# Patient Record
Sex: FEMALE | Race: WHITE | NOT HISPANIC OR LATINO | Employment: FULL TIME | URBAN - METROPOLITAN AREA
[De-identification: names, ages, dates, MRNs, and addresses within clinical notes are randomized per-mention and may not be internally consistent; named-entity substitution may affect disease eponyms.]

---

## 2019-01-02 ENCOUNTER — APPOINTMENT (OUTPATIENT)
Dept: RADIOLOGY | Facility: CLINIC | Age: 64
End: 2019-01-02
Payer: COMMERCIAL

## 2019-01-02 ENCOUNTER — OFFICE VISIT (OUTPATIENT)
Dept: URGENT CARE | Facility: CLINIC | Age: 64
End: 2019-01-02
Payer: COMMERCIAL

## 2019-01-02 VITALS
OXYGEN SATURATION: 97 % | SYSTOLIC BLOOD PRESSURE: 143 MMHG | HEIGHT: 63 IN | TEMPERATURE: 97 F | BODY MASS INDEX: 37.21 KG/M2 | RESPIRATION RATE: 20 BRPM | WEIGHT: 210 LBS | DIASTOLIC BLOOD PRESSURE: 89 MMHG | HEART RATE: 104 BPM

## 2019-01-02 DIAGNOSIS — R05.9 COUGH: ICD-10-CM

## 2019-01-02 DIAGNOSIS — J18.9 PNEUMONIA OF RIGHT LOWER LOBE DUE TO INFECTIOUS ORGANISM: ICD-10-CM

## 2019-01-02 DIAGNOSIS — R06.00 DYSPNEA ON EXERTION: ICD-10-CM

## 2019-01-02 DIAGNOSIS — R05.9 COUGH: Primary | ICD-10-CM

## 2019-01-02 PROCEDURE — 99203 OFFICE O/P NEW LOW 30 MIN: CPT | Performed by: EMERGENCY MEDICINE

## 2019-01-02 PROCEDURE — 71046 X-RAY EXAM CHEST 2 VIEWS: CPT

## 2019-01-02 PROCEDURE — 93005 ELECTROCARDIOGRAM TRACING: CPT

## 2019-01-02 RX ORDER — ESCITALOPRAM OXALATE 10 MG/1
5 TABLET ORAL DAILY
COMMUNITY

## 2019-01-02 RX ORDER — DULOXETIN HYDROCHLORIDE 60 MG/1
20 CAPSULE, DELAYED RELEASE ORAL DAILY
COMMUNITY

## 2019-01-02 RX ORDER — AMITRIPTYLINE HYDROCHLORIDE 25 MG/1
25 TABLET, FILM COATED ORAL
COMMUNITY

## 2019-01-02 RX ORDER — ALBUTEROL SULFATE 90 UG/1
2 AEROSOL, METERED RESPIRATORY (INHALATION) EVERY 6 HOURS PRN
Qty: 8.5 G | Refills: 0 | Status: SHIPPED | OUTPATIENT
Start: 2019-01-02

## 2019-01-02 RX ORDER — FENOFIBRATE 160 MG/1
160 TABLET ORAL DAILY
COMMUNITY

## 2019-01-02 RX ORDER — BENZONATATE 200 MG/1
200 CAPSULE ORAL 3 TIMES DAILY PRN
Qty: 12 CAPSULE | Refills: 0 | Status: SHIPPED | OUTPATIENT
Start: 2019-01-02

## 2019-01-02 RX ORDER — LEVOFLOXACIN 750 MG/1
750 TABLET ORAL EVERY 24 HOURS
Qty: 7 TABLET | Refills: 0 | Status: SHIPPED | OUTPATIENT
Start: 2019-01-02 | End: 2019-01-09

## 2019-01-02 RX ORDER — LOSARTAN POTASSIUM 100 MG/1
100 TABLET ORAL DAILY
COMMUNITY

## 2019-01-02 NOTE — PATIENT INSTRUCTIONS
Bacterial Pneumonia   WHAT YOU NEED TO KNOW:   Bacterial pneumonia is a lung infection caused by bacteria  It makes your lungs inflamed, which means they cannot work well  Bacterial pneumonia germs are easily spread when an infected person coughs, sneezes, or has close contact with others  DISCHARGE INSTRUCTIONS:   Seek care immediately if:   · You are confused and cannot think clearly  · You are urinating less or not at all  · You cough up blood  · You have more trouble breathing, or your breathing seems faster than normal     · Your heart or pulse beats more than 100 times in 1 minute  · Your lips or fingernails turn blue  Contact your healthcare provider if:   · Your symptoms are the same or get worse 48 hours after you start antibiotics  · You cannot eat or have loss of appetite, nausea, or are vomiting  · You have questions or concerns about your condition or care  Medicines:   · Antibiotics  treat pneumonia caused by bacteria  · Acetaminophen  decreases pain and fever  It is available without a doctor's order  Ask how much to take and how often to take it  Follow directions  Read the labels of all other medicines you are using to see if they also contain acetaminophen, or ask your doctor or pharmacist  Acetaminophen can cause liver damage if not taken correctly  Do not use more than 4 grams (4,000 milligrams) total of acetaminophen in one day  · NSAIDs , such as ibuprofen, help decrease swelling, pain, and fever  This medicine is available with or without a doctor's order  NSAIDs can cause stomach bleeding or kidney problems in certain people  If you take blood thinner medicine, always ask your healthcare provider if NSAIDs are safe for you  Always read the medicine label and follow directions  · Take your medicine as directed  Contact your healthcare provider if you think your medicine is not helping or if you have side effects   Tell him or her if you are allergic to any medicine  Keep a list of the medicines, vitamins, and herbs you take  Include the amounts, and when and why you take them  Bring the list or the pill bottles to follow-up visits  Carry your medicine list with you in case of an emergency  Follow up with your healthcare provider as directed:  Write down your questions so you remember to ask them during your visits  Manage your symptoms:   · Rest as needed  Rest often while you recover  Slowly start to do more each day  · Drink liquids as directed  Ask how much liquid to drink each day and which liquids are best for you  Liquids help thin your mucus, which may make it easier for you to cough it up  · Do not smoke  Avoid secondhand smoke  Smoking increases your risk for pneumonia  Smoking also makes it harder for you to get better after you have had pneumonia  Ask your healthcare provider for information if you currently smoke and need help to quit  E-cigarettes or smokeless tobacco still contain nicotine  Talk to your healthcare provider before you use these products  · Use a cool mist humidifier  to increase air moisture in your home  This may make it easier for you to breathe and help decrease your cough  · Keep your head elevated  You may be able to breathe better if you lie down with the head of your bed up  Prevent bacterial pneumonia:   · Prevent the spread of germs  Wash your hands often with soap and water  Use gel hand cleanser when there is no soap and water available  Do not touch your eyes, nose, or mouth unless you have washed your hands first  Cover your mouth when you cough  Cough into a tissue or your shirtsleeve so you do not spread germs from your hands  If you are sick, stay away from others as much as possible  · Limit alcohol  Women should limit alcohol to 1 drink a day  Men should limit alcohol to 2 drinks a day  A drink of alcohol is 12 ounces of beer, 5 ounces of wine, or 1½ ounces of liquor       · Ask about vaccines  You may need a vaccine to help prevent pneumonia  Get an influenza (flu) vaccine every year as soon as it becomes available  © 2017 [employer]  Information is for End User's use only and may not be sold, redistributed or otherwise used for commercial purposes  All illustrations and images included in CareNotes® are the copyrighted property of [employer]  or [employer]  The above information is an  only  It is not intended as medical advice for individual conditions or treatments  Talk to your doctor, nurse or pharmacist before following any medical regimen to see if it is safe and effective for you  Dyspnea   AMBULATORY CARE:   What is dyspnea? Dyspnea is breathing difficulty or discomfort  You may have labored, painful, or shallow breathing  You may feel breathless or short of breath  Dyspnea can occur during rest or with activity  You may have dyspnea for a short time, or it might become chronic  Dyspnea is often a symptom of a disease or condition  An allergic reaction, anxiety, or travel to high altitudes can increase your risk for dyspnea  Your risk is also increased by a lung condition such as asthma, a heart condition such as heart failure, or a nerve condition  Being overweight or smoking cigarettes can also lead to dyspnea  Signs and symptoms that can occur with dyspnea:   · Chest tightness or pain    · Cough or a coarse or high-pitched noise when you breathe    · Pale and sweaty, cool skin    · Confusion and tiredness    · Bluish-gray lips or nails  Seek care immediately if:   · Your signs and symptoms are the same or worse within 24 hours of treatment  · You have shaking chills or a fever over 102°F      · You have new pain, pressure, or tightness in your chest      · You have a new or worse cough or wheezing, or you cough up blood  · You feel like you cannot get enough air      · The skin over your ribs or on your neck sinks in when you breathe  · You have a severe headache with vomiting and abdominal pain  · You feel confused or dizzy  Contact your healthcare provider if:   · You have questions or concerns about your condition or care  Treatment:  You will work with your healthcare provider to treat the condition causing your dyspnea  You may need the following to improve your symptoms:  · Oxygen therapy  may be used to help you breathe easier  You may need oxygen if your blood oxygen level is lower than it should be  · Medicines  may be used to treat the cause of your dyspnea  Medicines may reduce swelling in your airway or decrease extra fluid from around your heart or lungs  Other medicines may be used to decrease anxiety and help you feel calm and relaxed  · Pulmonary rehabilitation  is used to reduce your symptoms while keeping you active  You may learn breathing techniques, muscle strengthening, and how to pace yourself when you are active  Manage long-term dyspnea:   · Create an action plan  You and your healthcare provider can work together to create a plan for how to handle episodes of dyspnea  The plan can include daily activities, treatment changes, and what to do if you have severe breathing problems  · Lean forward on your elbows when you sit  This helps your lungs expand and may make it easier to breathe  · Use pursed-lip breathing any time you feel short of breath  Breathe in through your nose and then slowly breathe out through your mouth with your lips slightly puckered  It should take you twice as long to breathe out as it did to breathe in  · Do not smoke  Nicotine and other chemicals in cigarettes and cigars can cause lung damage and make it harder to breathe  Ask your healthcare provider for information if you currently smoke and need help to quit  E-cigarettes or smokeless tobacco still contain nicotine  Talk to your healthcare provider before you use these products       · Reach or maintain a healthy weight  Your healthcare provider can help you create a safe weight loss plan if you are overweight  · Exercise as directed  Exercise can help your lungs work more easily  Exercise can also help you lose weight if needed  Try to get at least 30 minutes of exercise most days of the week  Your healthcare provider can help you create an exercise plan that is safe for you  Follow up with your healthcare provider or specialist as directed:  Write down your questions so you remember to ask them during your visits  © 2017 [facility] [facility] Information is for End User's use only and may not be sold, redistributed or otherwise used for commercial purposes  All illustrations and images included in CareNotes® are the copyrighted property of A D A M , Inc  or John Sanders  The above information is an  only  It is not intended as medical advice for individual conditions or treatments  Talk to your doctor, nurse or pharmacist before following any medical regimen to see if it is safe and effective for you

## 2019-01-02 NOTE — PROGRESS NOTES
[address]        NAME: Juan Francisco Hunt is a 61 y o  female  : 1955    MRN: [id number]  DATE: 2019  TIME: 1:58 PM    Assessment and Plan   Cough [R05]  1  Cough  XR chest pa & lateral   2  Pneumonia of right lower lobe due to infectious organism ([address] )     3  Dyspnea on exertion           Patient Instructions     Patient Instructions     Bacterial Pneumonia   WHAT YOU NEED TO KNOW:   Bacterial pneumonia is a lung infection caused by bacteria  It makes your lungs inflamed, which means they cannot work well  Bacterial pneumonia germs are easily spread when an infected person coughs, sneezes, or has close contact with others  DISCHARGE INSTRUCTIONS:   Seek care immediately if:   · You are confused and cannot think clearly  · You are urinating less or not at all  · You cough up blood  · You have more trouble breathing, or your breathing seems faster than normal     · Your heart or pulse beats more than 100 times in 1 minute  · Your lips or fingernails turn blue  Contact your healthcare provider if:   · Your symptoms are the same or get worse 48 hours after you start antibiotics  · You cannot eat or have loss of appetite, nausea, or are vomiting  · You have questions or concerns about your condition or care  Medicines:   · Antibiotics  treat pneumonia caused by bacteria  · Acetaminophen  decreases pain and fever  It is available without a doctor's order  Ask how much to take and how often to take it  Follow directions  Read the labels of all other medicines you are using to see if they also contain acetaminophen, or ask your doctor or pharmacist  Acetaminophen can cause liver damage if not taken correctly  Do not use more than 4 grams (4,000 milligrams) total of acetaminophen in one day  · NSAIDs , such as ibuprofen, help decrease swelling, pain, and fever  This medicine is available with or without a doctor's order   NSAIDs can cause stomach bleeding or kidney problems in certain people  If you take blood thinner medicine, always ask your healthcare provider if NSAIDs are safe for you  Always read the medicine label and follow directions  · Take your medicine as directed  Contact your healthcare provider if you think your medicine is not helping or if you have side effects  Tell him or her if you are allergic to any medicine  Keep a list of the medicines, vitamins, and herbs you take  Include the amounts, and when and why you take them  Bring the list or the pill bottles to follow-up visits  Carry your medicine list with you in case of an emergency  Follow up with your healthcare provider as directed:  Write down your questions so you remember to ask them during your visits  Manage your symptoms:   · Rest as needed  Rest often while you recover  Slowly start to do more each day  · Drink liquids as directed  Ask how much liquid to drink each day and which liquids are best for you  Liquids help thin your mucus, which may make it easier for you to cough it up  · Do not smoke  Avoid secondhand smoke  Smoking increases your risk for pneumonia  Smoking also makes it harder for you to get better after you have had pneumonia  Ask your healthcare provider for information if you currently smoke and need help to quit  E-cigarettes or smokeless tobacco still contain nicotine  Talk to your healthcare provider before you use these products  · Use a cool mist humidifier  to increase air moisture in your home  This may make it easier for you to breathe and help decrease your cough  · Keep your head elevated  You may be able to breathe better if you lie down with the head of your bed up  Prevent bacterial pneumonia:   · Prevent the spread of germs  Wash your hands often with soap and water  Use gel hand cleanser when there is no soap and water available   Do not touch your eyes, nose, or mouth unless you have washed your hands first  Cover your mouth when you cough  Cough into a tissue or your shirtsleeve so you do not spread germs from your hands  If you are sick, stay away from others as much as possible  · Limit alcohol  Women should limit alcohol to 1 drink a day  Men should limit alcohol to 2 drinks a day  A drink of alcohol is 12 ounces of beer, 5 ounces of wine, or 1½ ounces of liquor  · Ask about vaccines  You may need a vaccine to help prevent pneumonia  Get an influenza (flu) vaccine every year as soon as it becomes available  © 2017 [facility] Information is for End User's use only and may not be sold, redistributed or otherwise used for commercial purposes  All illustrations and images included in CareNotes® are the copyrighted property of A [employer] , [employer]  or John Sanders  The above information is an  only  It is not intended as medical advice for individual conditions or treatments  Talk to your doctor, nurse or pharmacist before following any medical regimen to see if it is safe and effective for you  Dyspnea   AMBULATORY CARE:   What is dyspnea? Dyspnea is breathing difficulty or discomfort  You may have labored, painful, or shallow breathing  You may feel breathless or short of breath  Dyspnea can occur during rest or with activity  You may have dyspnea for a short time, or it might become chronic  Dyspnea is often a symptom of a disease or condition  An allergic reaction, anxiety, or travel to high altitudes can increase your risk for dyspnea  Your risk is also increased by a lung condition such as asthma, a heart condition such as heart failure, or a nerve condition  Being overweight or smoking cigarettes can also lead to dyspnea    Signs and symptoms that can occur with dyspnea:   · Chest tightness or pain    · Cough or a coarse or high-pitched noise when you breathe    · Pale and sweaty, cool skin    · Confusion and tiredness    · Bluish-gray lips or nails  Seek care immediately if: · Your signs and symptoms are the same or worse within 24 hours of treatment  · You have shaking chills or a fever over 102°F      · You have new pain, pressure, or tightness in your chest      · You have a new or worse cough or wheezing, or you cough up blood  · You feel like you cannot get enough air  · The skin over your ribs or on your neck sinks in when you breathe  · You have a severe headache with vomiting and abdominal pain  · You feel confused or dizzy  Contact your healthcare provider if:   · You have questions or concerns about your condition or care  Treatment:  You will work with your healthcare provider to treat the condition causing your dyspnea  You may need the following to improve your symptoms:  · Oxygen therapy  may be used to help you breathe easier  You may need oxygen if your blood oxygen level is lower than it should be  · Medicines  may be used to treat the cause of your dyspnea  Medicines may reduce swelling in your airway or decrease extra fluid from around your heart or lungs  Other medicines may be used to decrease anxiety and help you feel calm and relaxed  · Pulmonary rehabilitation  is used to reduce your symptoms while keeping you active  You may learn breathing techniques, muscle strengthening, and how to pace yourself when you are active  Manage long-term dyspnea:   · Create an action plan  You and your healthcare provider can work together to create a plan for how to handle episodes of dyspnea  The plan can include daily activities, treatment changes, and what to do if you have severe breathing problems  · Lean forward on your elbows when you sit  This helps your lungs expand and may make it easier to breathe  · Use pursed-lip breathing any time you feel short of breath  Breathe in through your nose and then slowly breathe out through your mouth with your lips slightly puckered   It should take you twice as long to breathe out as it did to breathe in  · Do not smoke  Nicotine and other chemicals in cigarettes and cigars can cause lung damage and make it harder to breathe  Ask your healthcare provider for information if you currently smoke and need help to quit  E-cigarettes or smokeless tobacco still contain nicotine  Talk to your healthcare provider before you use these products  · Reach or maintain a healthy weight  Your healthcare provider can help you create a safe weight loss plan if you are overweight  · Exercise as directed  Exercise can help your lungs work more easily  Exercise can also help you lose weight if needed  Try to get at least 30 minutes of exercise most days of the week  Your healthcare provider can help you create an exercise plan that is safe for you  Follow up with your healthcare provider or specialist as directed:  Write down your questions so you remember to ask them during your visits  © 2017 [address] [employer]  Information is for End User's use only and may not be sold, redistributed or otherwise used for commercial purposes  All illustrations and images included in CareNotes® are the copyrighted property of A D A M [employer] Inc  or John Sanders  The above information is an  only  It is not intended as medical advice for individual conditions or treatments  Talk to your doctor, nurse or pharmacist before following any medical regimen to see if it is safe and effective for you  Follow up with PCP in 3-5 days  Proceed to  ER if symptoms worsen  Chief Complaint     Chief Complaint   Patient presents with    Shortness of Breath     trouble breathing, fatigued x 2 weeks         History of Present Illness       Patient complains of cough, fatigue and congestion for 2 weeks  She notes increasing shortness of breath for the past few days  She denies chest pain  She is a nonsmoker  She denies fever or chills  She denies history of PE or DVT    She denies recent leg pain or swelling  She saw her primary care doctor little over week ago for these symptoms and was given a prescription for erythromycin which she completed but noted no symptomatic improvement  Her doctor also wanted to send her for a stress test out of concern that her shortness of breath could be cardiac source however she claims the test was never scheduled  Review of Systems   Review of Systems   Constitutional: Positive for fatigue  Negative for chills and fever  HENT: Positive for congestion, rhinorrhea, sinus pressure and sore throat  Negative for trouble swallowing and voice change  Respiratory: Positive for cough  Negative for chest tightness, shortness of breath and wheezing  Cardiovascular: Negative for chest pain, palpitations and leg swelling  Gastrointestinal: Negative for vomiting  Skin: Negative for rash  Neurological: Negative for syncope and headaches           Current Medications       Current Outpatient Prescriptions:     amitriptyline (ELAVIL) 25 mg tablet, Take 25 mg by mouth daily at bedtime, Disp: , Rfl:     DULoxetine (CYMBALTA) 60 mg delayed release capsule, Take 20 mg by mouth daily, Disp: , Rfl:     escitalopram (LEXAPRO) 10 mg tablet, Take 5 mg by mouth daily, Disp: , Rfl:     fenofibrate (TRIGLIDE) 160 MG tablet, Take 160 mg by mouth daily, Disp: , Rfl:     losartan (COZAAR) 100 MG tablet, Take 100 mg by mouth daily, Disp: , Rfl:     sitaGLIPtin (JANUVIA) 100 mg tablet, Take 100 mg by mouth daily, Disp: , Rfl:     Current Allergies     Allergies as of 01/02/2019    (No Known Allergies)            The following portions of the patient's history were reviewed and updated as appropriate: allergies, current medications, past family history, past medical history, past social history, past surgical history and problem list      Past Medical History:   Diagnosis Date    Diabetes mellitus ([address] )     Hyperlipemia     Hypertension        No past surgical history on file     No family history on file  Medications have been verified  Objective   /89 (BP Location: Right arm, Patient Position: Sitting, Cuff Size: Large)   Pulse 104   Temp (!) 97 °F (36 1 °C) (Tympanic)   Resp 20   Ht 5' 3" (1 6 m)   Wt 95 3 kg (210 lb)   SpO2 97%   BMI 37 20 kg/m²        Physical Exam     Physical Exam   Constitutional: She is oriented to person, place, and time  She appears well-developed and well-nourished  No distress  HENT:   Head: Normocephalic and atraumatic  Right Ear: Tympanic membrane and external ear normal    Left Ear: Tympanic membrane and external ear normal    Nose: Mucosal edema present  Mouth/Throat: Posterior oropharyngeal erythema present  No oropharyngeal exudate or tonsillar abscesses  Neck: Neck supple  Cardiovascular: Normal rate and regular rhythm  Pulmonary/Chest: Effort normal  No respiratory distress  She has no wheezes  She has no rales  She exhibits no tenderness  Abdominal: Soft  Bowel sounds are normal    Musculoskeletal: She exhibits no edema or tenderness  Negative Homans   Neurological: She is alert and oriented to person, place, and time  Skin: Skin is warm and dry  Nursing note and vitals reviewed

## 2019-01-05 LAB
ATRIAL RATE: 100 BPM
P AXIS: 63 DEGREES
PR INTERVAL: 138 MS
QRS AXIS: 19 DEGREES
QRSD INTERVAL: 88 MS
QT INTERVAL: 376 MS
QTC INTERVAL: 485 MS
T WAVE AXIS: 78 DEGREES
VENTRICULAR RATE: 100 BPM

## 2019-01-05 PROCEDURE — 93010 ELECTROCARDIOGRAM REPORT: CPT | Performed by: INTERNAL MEDICINE

## 2023-05-21 ENCOUNTER — HOSPITAL ENCOUNTER (INPATIENT)
Facility: HOSPITAL | Age: 68
LOS: 3 days | Discharge: HOME | End: 2023-05-24
Attending: EMERGENCY MEDICINE | Admitting: FAMILY MEDICINE

## 2023-05-21 ENCOUNTER — APPOINTMENT (EMERGENCY)
Dept: RADIOLOGY | Facility: HOSPITAL | Age: 68
End: 2023-05-21

## 2023-05-21 DIAGNOSIS — E83.51 HYPOCALCEMIA: ICD-10-CM

## 2023-05-21 DIAGNOSIS — I50.9 ACUTE CHF (CONGESTIVE HEART FAILURE) (HCC): Primary | ICD-10-CM

## 2023-05-21 PROBLEM — I50.43 ACUTE ON CHRONIC COMBINED SYSTOLIC AND DIASTOLIC CONGESTIVE HEART FAILURE (HCC): Status: ACTIVE | Noted: 2020-06-25

## 2023-05-21 PROBLEM — Z79.4 TYPE 2 DIABETES MELLITUS WITHOUT COMPLICATION, WITH LONG-TERM CURRENT USE OF INSULIN (HCC): Status: ACTIVE | Noted: 2023-05-21

## 2023-05-21 PROBLEM — D63.1 ANEMIA IN CHRONIC KIDNEY DISEASE: Status: ACTIVE | Noted: 2020-06-25

## 2023-05-21 PROBLEM — E66.01 MORBID OBESITY WITH BMI OF 40.0-44.9, ADULT (HCC): Status: ACTIVE | Noted: 2023-05-21

## 2023-05-21 PROBLEM — E11.9 DIABETES MELLITUS (HCC): Status: ACTIVE | Noted: 2023-05-21

## 2023-05-21 PROBLEM — N18.9 ANEMIA IN CHRONIC KIDNEY DISEASE: Status: ACTIVE | Noted: 2020-06-25

## 2023-05-21 LAB
2HR DELTA HS TROPONIN: -1 NG/L
4HR DELTA HS TROPONIN: 2 NG/L
ALBUMIN SERPL BCP-MCNC: 4 G/DL (ref 3.5–5)
ALP SERPL-CCNC: 101 U/L (ref 34–104)
ALT SERPL W P-5'-P-CCNC: 12 U/L (ref 7–52)
ANION GAP SERPL CALCULATED.3IONS-SCNC: 12 MMOL/L (ref 4–13)
AST SERPL W P-5'-P-CCNC: 12 U/L (ref 13–39)
BASOPHILS # BLD AUTO: 0.05 THOUSANDS/ÂΜL (ref 0–0.1)
BASOPHILS NFR BLD AUTO: 1 % (ref 0–1)
BILIRUB SERPL-MCNC: 0.47 MG/DL (ref 0.2–1)
BNP SERPL-MCNC: 830 PG/ML (ref 0–100)
BUN SERPL-MCNC: 43 MG/DL (ref 5–25)
CA-I BLD-SCNC: 0.89 MMOL/L (ref 1.12–1.32)
CALCIUM SERPL-MCNC: 6.9 MG/DL (ref 8.4–10.2)
CARDIAC TROPONIN I PNL SERPL HS: 16 NG/L
CARDIAC TROPONIN I PNL SERPL HS: 17 NG/L
CARDIAC TROPONIN I PNL SERPL HS: 19 NG/L
CHLORIDE SERPL-SCNC: 102 MMOL/L (ref 96–108)
CHOLEST SERPL-MCNC: 216 MG/DL
CO2 SERPL-SCNC: 24 MMOL/L (ref 21–32)
CREAT SERPL-MCNC: 1.34 MG/DL (ref 0.6–1.3)
EOSINOPHIL # BLD AUTO: 0.46 THOUSAND/ÂΜL (ref 0–0.61)
EOSINOPHIL NFR BLD AUTO: 5 % (ref 0–6)
ERYTHROCYTE [DISTWIDTH] IN BLOOD BY AUTOMATED COUNT: 14.1 % (ref 11.6–15.1)
GFR SERPL CREATININE-BSD FRML MDRD: 41 ML/MIN/1.73SQ M
GLUCOSE SERPL-MCNC: 186 MG/DL (ref 65–140)
GLUCOSE SERPL-MCNC: 220 MG/DL (ref 65–140)
GLUCOSE SERPL-MCNC: 264 MG/DL (ref 65–140)
HCT VFR BLD AUTO: 35.1 % (ref 34.8–46.1)
HDLC SERPL-MCNC: 45 MG/DL
HGB BLD-MCNC: 11.2 G/DL (ref 11.5–15.4)
IMM GRANULOCYTES # BLD AUTO: 0.05 THOUSAND/UL (ref 0–0.2)
IMM GRANULOCYTES NFR BLD AUTO: 1 % (ref 0–2)
LYMPHOCYTES # BLD AUTO: 0.72 THOUSANDS/ÂΜL (ref 0.6–4.47)
LYMPHOCYTES NFR BLD AUTO: 9 % (ref 14–44)
MCH RBC QN AUTO: 28.8 PG (ref 26.8–34.3)
MCHC RBC AUTO-ENTMCNC: 31.9 G/DL (ref 31.4–37.4)
MCV RBC AUTO: 90 FL (ref 82–98)
MONOCYTES # BLD AUTO: 0.43 THOUSAND/ÂΜL (ref 0.17–1.22)
MONOCYTES NFR BLD AUTO: 5 % (ref 4–12)
NEUTROPHILS # BLD AUTO: 6.8 THOUSANDS/ÂΜL (ref 1.85–7.62)
NEUTS SEG NFR BLD AUTO: 79 % (ref 43–75)
NONHDLC SERPL-MCNC: 171 MG/DL
NRBC BLD AUTO-RTO: 0 /100 WBCS
PLATELET # BLD AUTO: 278 THOUSANDS/UL (ref 149–390)
PMV BLD AUTO: 10.7 FL (ref 8.9–12.7)
POTASSIUM SERPL-SCNC: 4.5 MMOL/L (ref 3.5–5.3)
PROT SERPL-MCNC: 6.9 G/DL (ref 6.4–8.4)
RBC # BLD AUTO: 3.89 MILLION/UL (ref 3.81–5.12)
SODIUM SERPL-SCNC: 138 MMOL/L (ref 135–147)
TRIGL SERPL-MCNC: 443 MG/DL
WBC # BLD AUTO: 8.51 THOUSAND/UL (ref 4.31–10.16)

## 2023-05-21 RX ORDER — PREGABALIN 100 MG/1
100 CAPSULE ORAL
COMMUNITY
Start: 2023-04-03 | End: 2024-04-02

## 2023-05-21 RX ORDER — DULOXETIN HYDROCHLORIDE 20 MG/1
20 CAPSULE, DELAYED RELEASE ORAL DAILY
Status: DISCONTINUED | OUTPATIENT
Start: 2023-05-21 | End: 2023-05-22

## 2023-05-21 RX ORDER — SACUBITRIL AND VALSARTAN 24; 26 MG/1; MG/1
TABLET, FILM COATED ORAL
COMMUNITY
Start: 2023-01-08

## 2023-05-21 RX ORDER — INSULIN GLARGINE 100 [IU]/ML
INJECTION, SOLUTION SUBCUTANEOUS
COMMUNITY
Start: 2023-04-26

## 2023-05-21 RX ORDER — EZETIMIBE 10 MG/1
10 TABLET ORAL
Status: DISCONTINUED | OUTPATIENT
Start: 2023-05-21 | End: 2023-05-22

## 2023-05-21 RX ORDER — INSULIN LISPRO 100 [IU]/ML
2-12 INJECTION, SOLUTION INTRAVENOUS; SUBCUTANEOUS
Status: DISCONTINUED | OUTPATIENT
Start: 2023-05-21 | End: 2023-05-24 | Stop reason: HOSPADM

## 2023-05-21 RX ORDER — FUROSEMIDE 10 MG/ML
40 INJECTION INTRAMUSCULAR; INTRAVENOUS ONCE
Status: COMPLETED | OUTPATIENT
Start: 2023-05-21 | End: 2023-05-21

## 2023-05-21 RX ORDER — CINACALCET 30 MG/1
1 TABLET, FILM COATED ORAL DAILY
COMMUNITY
Start: 2023-01-05 | End: 2023-05-24

## 2023-05-21 RX ORDER — ESCITALOPRAM OXALATE 5 MG/1
5 TABLET ORAL DAILY
Status: DISCONTINUED | OUTPATIENT
Start: 2023-05-21 | End: 2023-05-24 | Stop reason: HOSPADM

## 2023-05-21 RX ORDER — CALCIUM GLUCONATE 20 MG/ML
1 INJECTION, SOLUTION INTRAVENOUS ONCE
Status: COMPLETED | OUTPATIENT
Start: 2023-05-21 | End: 2023-05-21

## 2023-05-21 RX ORDER — ENOXAPARIN SODIUM 100 MG/ML
30 INJECTION SUBCUTANEOUS
Status: DISCONTINUED | OUTPATIENT
Start: 2023-05-21 | End: 2023-05-24 | Stop reason: HOSPADM

## 2023-05-21 RX ORDER — PREGABALIN 100 MG/1
100 CAPSULE ORAL DAILY
Status: DISCONTINUED | OUTPATIENT
Start: 2023-05-21 | End: 2023-05-24 | Stop reason: HOSPADM

## 2023-05-21 RX ORDER — EZETIMIBE 10 MG/1
10 TABLET ORAL
Status: ON HOLD | COMMUNITY
End: 2023-05-22 | Stop reason: CLARIF

## 2023-05-21 RX ORDER — INSULIN GLARGINE 100 [IU]/ML
10 INJECTION, SOLUTION SUBCUTANEOUS
Status: DISCONTINUED | OUTPATIENT
Start: 2023-05-21 | End: 2023-05-22

## 2023-05-21 RX ORDER — AMITRIPTYLINE HYDROCHLORIDE 25 MG/1
25 TABLET, FILM COATED ORAL
Status: CANCELLED | OUTPATIENT
Start: 2023-05-21

## 2023-05-21 RX ORDER — ACETAMINOPHEN 325 MG/1
650 TABLET ORAL EVERY 6 HOURS PRN
Status: DISCONTINUED | OUTPATIENT
Start: 2023-05-21 | End: 2023-05-24 | Stop reason: HOSPADM

## 2023-05-21 RX ADMIN — ESCITALOPRAM OXALATE 5 MG: 5 TABLET, FILM COATED ORAL at 12:12

## 2023-05-21 RX ADMIN — INSULIN LISPRO 4 UNITS: 100 INJECTION, SOLUTION INTRAVENOUS; SUBCUTANEOUS at 13:00

## 2023-05-21 RX ADMIN — ENOXAPARIN SODIUM 30 MG: 30 INJECTION SUBCUTANEOUS at 12:25

## 2023-05-21 RX ADMIN — DULOXETINE HYDROCHLORIDE 20 MG: 20 CAPSULE, DELAYED RELEASE ORAL at 12:12

## 2023-05-21 RX ADMIN — FUROSEMIDE 40 MG: 10 INJECTION, SOLUTION INTRAVENOUS at 16:15

## 2023-05-21 RX ADMIN — FUROSEMIDE 40 MG: 10 INJECTION, SOLUTION INTRAVENOUS at 09:09

## 2023-05-21 RX ADMIN — INSULIN GLARGINE 10 UNITS: 100 INJECTION, SOLUTION SUBCUTANEOUS at 21:02

## 2023-05-21 RX ADMIN — CALCIUM GLUCONATE 1 G: 20 INJECTION, SOLUTION INTRAVENOUS at 12:17

## 2023-05-21 RX ADMIN — PREGABALIN 100 MG: 100 CAPSULE ORAL at 12:12

## 2023-05-21 RX ADMIN — ACETAMINOPHEN 650 MG: 325 TABLET ORAL at 16:17

## 2023-05-21 RX ADMIN — EZETIMIBE 10 MG: 10 TABLET ORAL at 21:02

## 2023-05-21 RX ADMIN — CALCIUM GLUCONATE 1 G: 20 INJECTION, SOLUTION INTRAVENOUS at 09:09

## 2023-05-21 RX ADMIN — INSULIN LISPRO 2 UNITS: 100 INJECTION, SOLUTION INTRAVENOUS; SUBCUTANEOUS at 16:15

## 2023-05-21 NOTE — H&P
[address]  H&P  Name: Dary Luis 79 y o  female I MRN: [id number]  Unit/Bed#: -01 I Date of Admission: 5/21/2023   Date of Service: 5/21/2023 I Hospital Day: 0      Assessment/Plan   * Acute on chronic combined systolic and diastolic congestive heart failure (HCC)  Assessment & Plan  Wt Readings from Last 3 Encounters:   05/21/23 97 5 kg (214 lb 15 2 oz)   01/02/19 95 3 kg (210 lb)    known history of congestive heart failure however I could not find any documentation in her cardiologist or any previous 2D echoes for EF documentation  History of CAD s/p stent and also AICD  We will do 2D echo  Evaluate LV function  Recently diuretics were discontinued due to worsening creatinine  Presents with worsening shortness of breath and hypocalcemia  Give Lasix 2 doses and reassess BMP tomorrow and give further doses based on response  Hypocalcemia  Assessment & Plan  mayBe medication induced we will replace with IV calcium and monitor for now  Ionized calcium in a m  Morbid obesity with BMI of 40 0-44 9, adult ([facility] [address] )  Assessment & Plan  BMI is 40 61  Will need counseling on diet exercise and lifestyle modification    Essential hypertension  Assessment & Plan  bp currently slightly elevated secondary to acute CHF exacerbation    Restart home meds and adjust further and diuresis    Type 2 diabetes mellitus without complication, with long-term current use of insulin ([facility] [address] )  Assessment & Plan  No results found for: HGBA1C    Recent Labs     05/21/23  1115   POCGLU 220*       Blood Sugar Average: Last 72 hrs:  (P) 220   Placed on Lantus and insulin sliding scale regimen and diabetic diet    Anemia in chronic kidney disease  Assessment & Plan  Lab Results   Component Value Date    EGFR 41 05/21/2023    CREATININE 1 34 (H) 05/21/2023   Chronic anemia currently at baseline       VTE Prophylaxis: Enoxaparin (Lovenox)  / sequential compression device   Code Status: full code  POLST: There is no POLST form on file for this patient (pre-hospital)  Discussion with family: none    Anticipated Length of Stay:  Patient will be admitted on an Inpatient basis with an anticipated length of stay of  More than 2 midnights  Justification for Hospital Stay: aCute on chronic heart failure    Total Time for Visit, including Counseling / Coordination of Care: 60 minutes  Greater than 50% of this total time spent on direct patient counseling and coordination of care  Chief Complaint:   Shortness of breath    History of Present Illness:    Jared Ortiz is a 79 y o  female who presents with progressively worsening shortness of breath and dry cough for the last 3 to 4 days  Patient states that she used to be on water pills which were stopped due to her kidney numbers getting worse  Now she is having difficulty with dyspnea at rest and with exertion  Denies any fevers chills or sick contacts  Review of Systems:    Review of Systems   Constitutional: Positive for fatigue  Negative for appetite change, chills and fever  HENT: Negative for hearing loss, sore throat and trouble swallowing  Eyes: Negative for photophobia, discharge and visual disturbance  Respiratory: Positive for shortness of breath  Negative for chest tightness  Cardiovascular: Negative for chest pain and palpitations  Gastrointestinal: Negative for abdominal pain, blood in stool and vomiting  Endocrine: Negative for polydipsia and polyuria  Genitourinary: Negative for difficulty urinating, dysuria, flank pain and hematuria  Musculoskeletal: Negative for back pain and gait problem  Skin: Negative for rash  Allergic/Immunologic: Negative for environmental allergies and food allergies  Neurological: Negative for dizziness, seizures, syncope and headaches  Hematological: Does not bruise/bleed easily  Psychiatric/Behavioral: Negative for behavioral problems  All other systems reviewed and are negative        Past Medical and Surgical History:     Past Medical History:   Diagnosis Date   • Anemia    • Anxiety    • CHF (congestive heart failure) ([place] [id number] )    • Chronic kidney disease, stage 3 (HCC)    • Depression    • Diabetes mellitus ([place] [address] )    • Hyperlipemia    • Hypertension    • Ischemic cardiomyopathy    • MI (myocardial infarction) ([place] [address] )    • Pacemaker    • Peripheral polyneuropathy    • Sinus tachycardia        Past Surgical History:   Procedure Laterality Date   • CARDIAC PACEMAKER PLACEMENT     • CORONARY ANGIOPLASTY WITH STENT PLACEMENT         Meds/Allergies:    Prior to Admission medications    Medication Sig Start Date End Date Taking? Authorizing Provider   amitriptyline (ELAVIL) 25 mg tablet Take 25 mg by mouth daily at bedtime   Yes Historical Provider, MD   cinacalcet (SENSIPAR) 30 mg tablet Take 1 tablet by mouth daily 1/5/23  Yes Historical Provider, MD   DULoxetine HCl 20 MG CSDR Take 20 mg by mouth daily   Yes Historical Provider, MD   escitalopram (LEXAPRO) 10 mg tablet Take 5 mg by mouth daily   Yes Historical Provider, MD   ezetimibe (ZETIA) 10 mg tablet Take 10 mg by mouth   Yes Historical Provider, MD   Insulin Glargine Solostar 100 UNIT/ML SOPN INJECT 14 UNITS SUBCUTANEOUSLY NIGHTLY 4/26/23  Yes Historical Provider, MD   losartan (COZAAR) 100 MG tablet Take 100 mg by mouth daily   Yes Historical Provider, MD   pregabalin (LYRICA) 100 mg capsule Take 100 mg by mouth 4/3/23 4/2/24 Yes Historical Provider, MD   sacubitril-valsartan (Entresto) 24-26 MG TABS 2 (two) times daily   1/8/23  Yes Historical Provider, MD   albuterol [facility] HFA) 90 mcg/act inhaler Inhale 2 puffs every 6 (six) hours as needed for wheezing 1/2/19 5/21/23  Marleta Severe, MD   benzonatate (TESSALON) 200 MG capsule Take 1 capsule (200 mg total) by mouth 3 (three) times a day as needed for cough 1/2/19 5/21/23  Marleta Severe, MD   fenofibrate (TRIGLIDE) 160 MG tablet Take 160 mg by mouth daily  5/21/23  Historical Provider, MD "  sitaGLIPtin (JANUVIA) 100 mg tablet Take 100 mg by mouth daily  5/21/23  Historical Provider, MD     I have reviewed home medications with patient personally  Allergies: No Known Allergies    Social History:     Marital Status: Single     Social History     Substance and Sexual Activity   Alcohol Use Not Currently     Social History     Tobacco Use   Smoking Status Never   • Passive exposure: Never   Smokeless Tobacco Never     Social History     Substance and Sexual Activity   Drug Use Never       Family History:    History reviewed  No pertinent family history  Mother had htn  Physical Exam:     Vitals:   Blood Pressure: 161/100 (05/21/23 1025)  Pulse: 103 (05/21/23 1025)  Temperature: (!) 97 3 °F (36 3 °C) (05/21/23 1025)  Respirations: 20 (05/21/23 1025)  Height: 5' 1\" (154 9 cm) (05/21/23 0748)  Weight - Scale: 97 5 kg (214 lb 15 2 oz) (05/21/23 0748)  SpO2: 94 % (05/21/23 1025)    Physical Exam  Vitals and nursing note reviewed  Constitutional:       Appearance: Normal appearance  She is obese  HENT:      Head: Normocephalic and atraumatic  Right Ear: External ear normal       Left Ear: External ear normal       Nose: Nose normal       Mouth/Throat:      Pharynx: Oropharynx is clear  Eyes:      Pupils: Pupils are equal, round, and reactive to light  Cardiovascular:      Rate and Rhythm: Normal rate and regular rhythm  Heart sounds: Normal heart sounds  Pulmonary:      Effort: Pulmonary effort is normal       Breath sounds: Rales present  Abdominal:      General: Bowel sounds are normal       Palpations: Abdomen is soft  Tenderness: There is no abdominal tenderness  Musculoskeletal:         General: Normal range of motion  Cervical back: Normal range of motion and neck supple  Skin:     General: Skin is warm and dry  Capillary Refill: Capillary refill takes less than 2 seconds  Neurological:      General: No focal deficit present        Mental Status: She is alert " and oriented to person, place, and time  Psychiatric:         Mood and Affect: Mood normal            Additional Data:     Lab Results: I have personally reviewed pertinent reports  Results from last 7 days   Lab Units 05/21/23  0755   WBC Thousand/uL 8 51   HEMOGLOBIN g/dL 11 2*   HEMATOCRIT % 35 1   PLATELETS Thousands/uL 278   NEUTROS PCT % 79*   LYMPHS PCT % 9*   MONOS PCT % 5   EOS PCT % 5     Results from last 7 days   Lab Units 05/21/23  0755   SODIUM mmol/L 138   POTASSIUM mmol/L 4 5   CHLORIDE mmol/L 102   CO2 mmol/L 24   BUN mg/dL 43*   CREATININE mg/dL 1 34*   ANION GAP mmol/L 12   CALCIUM mg/dL 6 9*   ALBUMIN g/dL 4 0   TOTAL BILIRUBIN mg/dL 0 47   ALK PHOS U/L 101   ALT U/L 12   AST U/L 12*   GLUCOSE RANDOM mg/dL 264*         Results from last 7 days   Lab Units 05/21/23  1115   POC GLUCOSE mg/dl 220*               Imaging: I have personally reviewed pertinent reports  XR chest 1 view portable   ED Interpretation by Argelia Pabon MD (05/21 0524)   Pulmonary vascular congestion          EKG, Pathology, and Other Studies Reviewed on Admission:   · EKG: sinus tach    Allscripts / Epic Records Reviewed: Yes     ** Please Note: This note has been constructed using a voice recognition system   **

## 2023-05-21 NOTE — ED PROVIDER NOTES
"History  Chief Complaint   Patient presents with   • Shortness of Breath     Yesterday started feeling SOB with a dry cough, states felt better in air conditioning, denies fever, margarita received 1 albuterol tx with relief     HPI   67F w hx of CHF due to ischemic cardiomyopathy, COPD, CAD s/p 4 stents, DM2, afib presenting with shortness of breath and cough  For the past few days, patient has been having shortness of breath  States she has been having a cough, not productive of sputum  +sore throat  Denies fevers, rhinorrhea, congestion, chest pain, pressure  States she is no longer on lasix because her cardiologist at [facility] took her off of it a few months ago  States she received a breathing treatment from EMS and feels Soosalu lot better  \" Denies smoking  Prior to Admission Medications   Prescriptions Last Dose Informant Patient Reported? Taking? DULoxetine HCl 20 MG CSDR   Yes Yes   Sig: Take 20 mg by mouth daily   Insulin Glargine Solostar (Lantus SoloStar) 100 UNIT/ML SOPN   Yes Yes   Sig: INJECT 14 UNITS SUBCUTANEOUSLY NIGHTLY   amitriptyline (ELAVIL) 25 mg tablet   Yes Yes   Sig: Take 25 mg by mouth daily at bedtime   cinacalcet (SENSIPAR) 30 mg tablet   Yes Yes   Sig: Take 1 tablet by mouth daily   escitalopram (LEXAPRO) 10 mg tablet   Yes Yes   Sig: Take 5 mg by mouth daily   ezetimibe (ZETIA) 10 mg tablet   Yes Yes   Sig: Take 10 mg by mouth   losartan (COZAAR) 100 MG tablet   Yes No   Sig: Take 100 mg by mouth daily   pregabalin (LYRICA) 100 mg capsule   Yes Yes   Sig: Take 100 mg by mouth   sacubitril-valsartan (Entresto) 24-26 MG TABS   Yes Yes   Si (two) times daily        Facility-Administered Medications: None       Past Medical History:   Diagnosis Date   • Anemia    • Anxiety    • CHF (congestive heart failure) ([facility])    • Chronic kidney disease, stage 3 (HCC)    • Depression    • Diabetes mellitus ([facility] [address] )    • Hyperlipemia    • Hypertension    • Ischemic cardiomyopathy    • MI (myocardial " infarction) [facility])    • Pacemaker    • Peripheral polyneuropathy    • Sinus tachycardia        Past Surgical History:   Procedure Laterality Date   • CARDIAC PACEMAKER PLACEMENT     • CORONARY ANGIOPLASTY WITH STENT PLACEMENT         History reviewed  No pertinent family history  I have reviewed and agree with the history as documented  E-Cigarette/Vaping   • E-Cigarette Use Never User      E-Cigarette/Vaping Substances     Social History     Tobacco Use   • Smoking status: Never     Passive exposure: Never   • Smokeless tobacco: Never   Vaping Use   • Vaping Use: Never used   Substance Use Topics   • Alcohol use: Not Currently   • Drug use: Never       Review of Systems   Constitutional: Negative for chills and fever  HENT: Negative for ear pain and sore throat  Eyes: Negative for pain and visual disturbance  Respiratory: Positive for shortness of breath  Negative for cough  Cardiovascular: Positive for leg swelling  Negative for chest pain and palpitations  Gastrointestinal: Negative for abdominal pain and vomiting  Genitourinary: Negative for dysuria and hematuria  Musculoskeletal: Negative for arthralgias and back pain  Skin: Negative for color change and rash  Neurological: Negative for seizures and syncope  All other systems reviewed and are negative  Physical Exam  Physical Exam  Vitals and nursing note reviewed  Constitutional:       General: She is not in acute distress  Appearance: She is well-developed  HENT:      Head: Normocephalic and atraumatic  Right Ear: External ear normal       Left Ear: External ear normal       Nose: Nose normal    Eyes:      Conjunctiva/sclera: Conjunctivae normal    Cardiovascular:      Rate and Rhythm: Normal rate and regular rhythm  Pulmonary:      Effort: Pulmonary effort is normal  No respiratory distress  Breath sounds: Examination of the right-lower field reveals rales  Examination of the left-lower field reveals rales  Rales present  Abdominal:      Palpations: Abdomen is soft  Tenderness: There is no abdominal tenderness  Musculoskeletal:      Cervical back: Normal range of motion and neck supple  Right lower leg: Edema present  Left lower leg: Edema present  Comments: 2+ pitting edema   Skin:     General: Skin is warm and dry  Neurological:      General: No focal deficit present  Mental Status: She is alert  Mental status is at baseline           Vital Signs  ED Triage Vitals   Temperature Pulse Respirations Blood Pressure SpO2   05/21/23 0748 05/21/23 0748 05/21/23 0748 05/21/23 0748 05/21/23 0744   97 9 °F (36 6 °C) 97 20 153/87 94 %      Temp src Heart Rate Source Patient Position - Orthostatic VS BP Location FiO2 (%)   -- 05/21/23 0748 05/21/23 0748 05/21/23 0748 --    Monitor Lying Left arm       Pain Score       05/21/23 0748       No Pain           Vitals:    05/21/23 0815 05/21/23 0830 05/21/23 0900 05/21/23 0915   BP: 137/72 125/65 121/68 124/69   Pulse: 92 96 95 95   Patient Position - Orthostatic VS:           Visual Acuity      ED Medications  Medications   furosemide (LASIX) injection 40 mg (40 mg Intravenous Given 5/21/23 0909)   calcium gluconate 1 g in sodium chloride 0 9% 50 mL (premix) (1 g Intravenous New Bag 5/21/23 0909)       Diagnostic Studies  Results Reviewed     Procedure Component Value Units Date/Time    B-Type Natriuretic Peptide(BNP) [[id number]]  (Abnormal) Collected: 05/21/23 0755    Lab Status: Final result Specimen: Blood from Line, Venous Updated: 05/21/23 0845      pg/mL     Calcium, ionized [[id number]]     Lab Status: No result Specimen: Blood     HS Troponin 0hr (reflex protocol) [[id number]]  (Normal) Collected: 05/21/23 0755    Lab Status: Final result Specimen: Blood from Line, Venous Updated: 05/21/23 0823     hs TnI 0hr 17 ng/L     HS Troponin I 2hr [[id number]]     Lab Status: No result Specimen: Blood     HS Troponin I 4hr [[id number]]     Lab Status: No result Specimen: Blood     Comprehensive metabolic panel [[id number]]  (Abnormal) Collected: 05/21/23 0755    Lab Status: Final result Specimen: Blood from Line, Venous Updated: 05/21/23 0819     Sodium 138 mmol/L      Potassium 4 5 mmol/L      Chloride 102 mmol/L      CO2 24 mmol/L      ANION GAP 12 mmol/L      BUN 43 mg/dL      Creatinine 1 34 mg/dL      Glucose 264 mg/dL      Calcium 6 9 mg/dL      AST 12 U/L      ALT 12 U/L      Alkaline Phosphatase 101 U/L      Total Protein 6 9 g/dL      Albumin 4 0 g/dL      Total Bilirubin 0 47 mg/dL      eGFR 41 ml/min/1 73sq m     Narrative:      National Kidney Disease Foundation guidelines for Chronic Kidney Disease (CKD):   •  Stage 1 with normal or high GFR (GFR > 90 mL/min/1 73 square meters)  •  Stage 2 Mild CKD (GFR = 60-89 mL/min/1 73 square meters)  •  Stage 3A Moderate CKD (GFR = 45-59 mL/min/1 73 square meters)  •  Stage 3B Moderate CKD (GFR = 30-44 mL/min/1 73 square meters)  •  Stage 4 Severe CKD (GFR = 15-29 mL/min/1 73 square meters)  •  Stage 5 End Stage CKD (GFR <15 mL/min/1 73 square meters)  Note: GFR calculation is accurate only with a steady state creatinine    CBC and differential [[id number]]  (Abnormal) Collected: 05/21/23 0755    Lab Status: Final result Specimen: Blood from Line, Venous Updated: 05/21/23 0801     WBC 8 51 Thousand/uL      RBC 3 89 Million/uL      Hemoglobin 11 2 g/dL      Hematocrit 35 1 %      MCV 90 fL      MCH 28 8 pg      MCHC 31 9 g/dL      RDW 14 1 %      MPV 10 7 fL      Platelets 798 Thousands/uL      nRBC 0 /100 WBCs      Neutrophils Relative 79 %      Immat GRANS % 1 %      Lymphocytes Relative 9 %      Monocytes Relative 5 %      Eosinophils Relative 5 %      Basophils Relative 1 %      Neutrophils Absolute 6 80 Thousands/µL      Immature Grans Absolute 0 05 Thousand/uL      Lymphocytes Absolute 0 72 Thousands/µL      Monocytes Absolute 0 43 Thousand/µL      Eosinophils Absolute 0 46 Thousand/µL      Basophils Absolute 0 05 Thousands/µL                  XR chest 1 view portable   ED Interpretation by Gretta Foreman MD (05/21 7943)   Pulmonary vascular congestion               Procedures  ECG 12 Lead Documentation Only    Date/Time: 5/21/2023 7:53 AM  Performed by: Gretta Foreman MD  Authorized by: Gretta Foreman MD     Interpretation:     Interpretation: non-specific    Rate:     ECG rate:  101    ECG rate assessment: tachycardic    Rhythm:     Rhythm: sinus rhythm    QRS:     QRS axis:  Left  Conduction:     Conduction: abnormal      Abnormal conduction: non-specific intraventricular conduction delay    ST segments:     ST segments:  Non-specific  T waves:     T waves: non-specific          ED Course  ED Course as of 05/21/23 0954   Sun May 21, 2023   0804 WBC: 8 51   0804 Hemoglobin(!): 11 2   0834 Sodium: 138   0834 Potassium: 4 5   0834 Calcium(!): 6 9  Will give IV calcium for repletion  0856 BNP(!): 830   0857 hs TnI 0hr: 17   9674 CXR per my independent review and interpretation significant for pulmonary vascular congestion  7834 Patient dropped to 86-87% with ambulation with good waveform that I observed  Took approx 1 minute for SpO2 to go up to 91% with rest  Will contact hospitalist for admission  0957 I discussed w hospitalist, Dr Mat Erickson  Accepts patient for admission  SBIRT 20yo+    Flowsheet Row Most Recent Value   Initial Alcohol Screen: US AUDIT-C     1  How often do you have a drink containing alcohol? 0 Filed at: 05/21/2023 0748   2  How many drinks containing alcohol do you have on a typical day you are drinking? 0 Filed at: 05/21/2023 0748   3a  Male UNDER 65: How often do you have five or more drinks on one occasion? 0 Filed at: 05/21/2023 0748   3b  FEMALE Any Age, or MALE 65+: How often do you have 4 or more drinks on one occassion? 0 Filed at: 05/21/2023 0748   Audit-C Score 0 Filed at: 05/21/2023 0077   JAKE: How many times in the past year have you        Used an illegal drug or used a prescription medication for non-medical reasons? Never Filed at: 05/21/2023 8761        Medical Decision Making  67F presenting with shortness of breath  Appears volume overloaded with crackles and 2+ pitting edema  Ddxs include CHF exacerbation vs afib vs COPD vs pneumonia vs pneumothorax  Labwork and CXR obtained to investigate above etiologies  EKG w/o acute ischemic abnl  Labwork significant for troponin 17, repeat pending  Hypocalcemia 6 9, and patient was given IV calcium for repletion  Creatinine 1 34 which is around patient's baseline compared to Care Everywhere  BNP elevated at 830  CXR independently reviewed and interpreted by me as pulmonary vascular congestion  Clinical presentation and workup significant for CHF exacerbation  She was given 40mg of IV lasix  Patient was ambulated by me in the ED room, and her SpO2 dropped to 86-87% on room air with exertion  Case was discussed w hospitalist and patient was admitted  Acute CHF (congestive heart failure) ([facility] [address] ): acute illness or injury  Hypocalcemia: acute illness or injury  Amount and/or Complexity of Data Reviewed  External Data Reviewed: labs and notes  Labs: ordered  Decision-making details documented in ED Course  Radiology: ordered and independent interpretation performed  Risk  Prescription drug management  Decision regarding hospitalization          Disposition  Final diagnoses:   Acute CHF (congestive heart failure) ([facility] [address] )   Hypocalcemia     Time reflects when diagnosis was documented in both MDM as applicable and the Disposition within this note     Time User Action Codes Description Comment    5/21/2023  9:51 AM Saira Bailey [I50 9] Acute CHF (congestive heart failure) ([facility] [address] )     5/21/2023  9:51 AM Saira Tellez Add [E83 51] Hypocalcemia       ED Disposition     ED Disposition   Admit    Condition   Stable    Date/Time   Sun May 21, 2023  9:51 AM    Comment   Case was discussed with Dr Jose Marino and the patient's admission status was agreed to be Admission Status: inpatient status to the service of Dr Rosado Tang  Follow-up Information    None         Patient's Medications   Discharge Prescriptions    No medications on file       No discharge procedures on file      PDMP Review     None          ED Provider  Electronically Signed by           Sabina Alvarez MD  05/21/23 5786

## 2023-05-21 NOTE — NURSING NOTE
Unable to process/complete med rec, phone [facility] in [place] no one answers, patient unsure of all the medications she takes

## 2023-05-21 NOTE — ASSESSMENT & PLAN NOTE
Lab Results   Component Value Date    EGFR 41 05/21/2023    CREATININE 1 34 (H) 05/21/2023   Chronic anemia currently at baseline

## 2023-05-21 NOTE — ASSESSMENT & PLAN NOTE
Wt Readings from Last 3 Encounters:   05/21/23 97 5 kg (214 lb 15 2 oz)   01/02/19 95 3 kg (210 lb)    known history of congestive heart failure however I could not find any documentation in her cardiologist or any previous 2D echoes for EF documentation  History of CAD s/p stent and also AICD  We will do 2D echo  Evaluate LV function  Recently diuretics were discontinued due to worsening creatinine  Presents with worsening shortness of breath and hypocalcemia  Give Lasix 2 doses and reassess BMP tomorrow and give further doses based on response

## 2023-05-21 NOTE — ASSESSMENT & PLAN NOTE
No results found for: HGBA1C    Recent Labs     05/21/23  1115   POCGLU 220*       Blood Sugar Average: Last 72 hrs:  (P) 220   Placed on Lantus and insulin sliding scale regimen and diabetic diet

## 2023-05-21 NOTE — ASSESSMENT & PLAN NOTE
bp currently slightly elevated secondary to acute CHF exacerbation    Restart home meds and adjust further and diuresis

## 2023-05-21 NOTE — ASSESSMENT & PLAN NOTE
mayBe medication induced we will replace with IV calcium and monitor for now  Ionized calcium in a m

## 2023-05-21 NOTE — PLAN OF CARE
Problem: CARDIOVASCULAR - ADULT  Goal: Maintains optimal cardiac output and hemodynamic stability  Description: INTERVENTIONS:  - Monitor I/O, vital signs and rhythm  - Monitor for S/S and trends of decreased cardiac output SOB BUSTOS swelling   - Administer and titrate ordered vasoactive medications to optimize hemodynamic stability  - Assess quality of pulses, skin color and temperature  - Assess for signs of decreased coronary artery perfusion  - Instruct patient to report change in severity of symptoms  Outcome: Progressing

## 2023-05-22 ENCOUNTER — APPOINTMENT (INPATIENT)
Dept: NON INVASIVE DIAGNOSTICS | Facility: HOSPITAL | Age: 68
End: 2023-05-22

## 2023-05-22 PROBLEM — N18.31 STAGE 3A CHRONIC KIDNEY DISEASE (HCC): Status: ACTIVE | Noted: 2023-05-22

## 2023-05-22 LAB
ANION GAP SERPL CALCULATED.3IONS-SCNC: 13 MMOL/L (ref 4–13)
AORTIC ROOT: 3.2 CM
AORTIC VALVE MEAN VELOCITY: 9.2 M/S
APICAL FOUR CHAMBER EJECTION FRACTION: 31 %
ASCENDING AORTA: 3.2 CM
AV AREA BY CONTINUOUS VTI: 1.9 CM2
AV AREA PEAK VELOCITY: 1.4 CM2
AV LVOT MEAN GRADIENT: 1 MMHG
AV LVOT PEAK GRADIENT: 2 MMHG
AV MEAN GRADIENT: 4 MMHG
AV PEAK GRADIENT: 7 MMHG
AV VALVE AREA: 1.9 CM2
AV VELOCITY RATIO: 0.48
BUN SERPL-MCNC: 47 MG/DL (ref 5–25)
CA-I BLD-SCNC: 0.88 MMOL/L (ref 1.12–1.32)
CALCIUM SERPL-MCNC: 7.3 MG/DL (ref 8.4–10.2)
CHLORIDE SERPL-SCNC: 99 MMOL/L (ref 96–108)
CO2 SERPL-SCNC: 25 MMOL/L (ref 21–32)
CREAT SERPL-MCNC: 1.34 MG/DL (ref 0.6–1.3)
DOP CALC AO PEAK VEL: 1.34 M/S
DOP CALC AO VTI: 22.48 CM
DOP CALC LVOT AREA: 2.83 CM2
DOP CALC LVOT DIAMETER: 1.9 CM
DOP CALC LVOT PEAK VEL VTI: 15.05 CM
DOP CALC LVOT PEAK VEL: 0.64 M/S
DOP CALC LVOT STROKE INDEX: 21.5 ML/M2
DOP CALC LVOT STROKE VOLUME: 42.65
E WAVE DECELERATION TIME: 123 MS
ERYTHROCYTE [DISTWIDTH] IN BLOOD BY AUTOMATED COUNT: 13.8 % (ref 11.6–15.1)
FRACTIONAL SHORTENING: 18 (ref 28–44)
GFR SERPL CREATININE-BSD FRML MDRD: 41 ML/MIN/1.73SQ M
GLUCOSE SERPL-MCNC: 174 MG/DL (ref 65–140)
GLUCOSE SERPL-MCNC: 188 MG/DL (ref 65–140)
GLUCOSE SERPL-MCNC: 195 MG/DL (ref 65–140)
GLUCOSE SERPL-MCNC: 206 MG/DL (ref 65–140)
GLUCOSE SERPL-MCNC: 222 MG/DL (ref 65–140)
GLUCOSE SERPL-MCNC: 281 MG/DL (ref 65–140)
HCT VFR BLD AUTO: 35.9 % (ref 34.8–46.1)
HGB BLD-MCNC: 11.3 G/DL (ref 11.5–15.4)
INTERVENTRICULAR SEPTUM IN DIASTOLE (PARASTERNAL SHORT AXIS VIEW): 1 CM
INTERVENTRICULAR SEPTUM: 1 CM (ref 0.6–1.1)
LEFT ATRIUM SIZE: 3.2 CM
LEFT INTERNAL DIMENSION IN SYSTOLE: 4.6 CM (ref 2.1–4)
LEFT VENTRICLE DIASTOLIC VOLUME (MOD BIPLANE): 157 ML
LEFT VENTRICLE SYSTOLIC VOLUME (MOD BIPLANE): 100 ML
LEFT VENTRICULAR INTERNAL DIMENSION IN DIASTOLE: 5.6 CM (ref 3.5–6)
LEFT VENTRICULAR POSTERIOR WALL IN END DIASTOLE: 1 CM
LEFT VENTRICULAR STROKE VOLUME: 55 ML
LV EF: 36 %
LVSV (TEICH): 55 ML
MCH RBC QN AUTO: 27.8 PG (ref 26.8–34.3)
MCHC RBC AUTO-ENTMCNC: 31.5 G/DL (ref 31.4–37.4)
MCV RBC AUTO: 88 FL (ref 82–98)
MITRAL REGURGITATION PEAK VELOCITY: 4.94 M/S
MITRAL VALVE MEAN INFLOW VELOCITY: 3.51 M/S
MITRAL VALVE REGURGITANT PEAK GRADIENT: 98 MMHG
MV E'TISSUE VEL-LAT: 4 CM/S
MV E'TISSUE VEL-SEP: 3 CM/S
MV PEAK A VEL: 0.98 M/S
MV PEAK E VEL: 61 CM/S
MV STENOSIS PRESSURE HALF TIME: 36 MS
MV VALVE AREA P 1/2 METHOD: 6.11
PHOSPHATE SERPL-MCNC: 4.8 MG/DL (ref 2.3–4.1)
PISA MRMAX VEL: 0.3 M/S
PISA RADIUS: 0.6 CM
PLATELET # BLD AUTO: 269 THOUSANDS/UL (ref 149–390)
PMV BLD AUTO: 10.9 FL (ref 8.9–12.7)
POTASSIUM SERPL-SCNC: 3.6 MMOL/L (ref 3.5–5.3)
PTH-INTACT SERPL-MCNC: 30.8 PG/ML (ref 12–88)
PV PEAK GRADIENT: 5 MMHG
RA PRESSURE ESTIMATED: 3 MMHG
RBC # BLD AUTO: 4.07 MILLION/UL (ref 3.81–5.12)
RIGHT ATRIUM AREA SYSTOLE A4C: 10.1 CM2
RIGHT VENTRICLE ID DIMENSION: 3.2 CM
SL CV DOP CALC MV VTI RETROGRADE: 161.6 CM
SL CV LV EF: 30
SL CV MV MEAN GRADIENT RETROGRADE: 57 MMHG
SL CV PED ECHO LEFT VENTRICLE DIASTOLIC VOLUME (MOD BIPLANE) 2D: 151 ML
SL CV PED ECHO LEFT VENTRICLE SYSTOLIC VOLUME (MOD BIPLANE) 2D: 96 ML
SODIUM SERPL-SCNC: 137 MMOL/L (ref 135–147)
TRICUSPID ANNULAR PLANE SYSTOLIC EXCURSION: 1.9 CM
TSH SERPL DL<=0.05 MIU/L-ACNC: 2.05 UIU/ML (ref 0.45–4.5)
WBC # BLD AUTO: 7.44 THOUSAND/UL (ref 4.31–10.16)

## 2023-05-22 RX ORDER — GLIPIZIDE 10 MG/1
10 TABLET ORAL
COMMUNITY
End: 2023-05-24

## 2023-05-22 RX ORDER — ALLOPURINOL 100 MG/1
100 TABLET ORAL DAILY
COMMUNITY

## 2023-05-22 RX ORDER — FUROSEMIDE 10 MG/ML
60 INJECTION INTRAMUSCULAR; INTRAVENOUS ONCE
Status: COMPLETED | OUTPATIENT
Start: 2023-05-22 | End: 2023-05-22

## 2023-05-22 RX ORDER — MIDODRINE HYDROCHLORIDE 5 MG/1
5 TABLET ORAL 2 TIMES DAILY
COMMUNITY

## 2023-05-22 RX ORDER — CARVEDILOL 3.12 MG/1
3.12 TABLET ORAL 2 TIMES DAILY WITH MEALS
COMMUNITY

## 2023-05-22 RX ORDER — ALLOPURINOL 100 MG/1
100 TABLET ORAL DAILY
Status: DISCONTINUED | OUTPATIENT
Start: 2023-05-22 | End: 2023-05-24 | Stop reason: HOSPADM

## 2023-05-22 RX ORDER — CLOPIDOGREL BISULFATE 75 MG/1
75 TABLET ORAL DAILY
Status: DISCONTINUED | OUTPATIENT
Start: 2023-05-22 | End: 2023-05-24 | Stop reason: HOSPADM

## 2023-05-22 RX ORDER — FUROSEMIDE 10 MG/ML
40 INJECTION INTRAMUSCULAR; INTRAVENOUS ONCE
Status: DISCONTINUED | OUTPATIENT
Start: 2023-05-22 | End: 2023-05-22

## 2023-05-22 RX ORDER — ATORVASTATIN CALCIUM 80 MG/1
80 TABLET, FILM COATED ORAL DAILY
COMMUNITY

## 2023-05-22 RX ORDER — CARVEDILOL 3.12 MG/1
3.12 TABLET ORAL 2 TIMES DAILY WITH MEALS
Status: DISCONTINUED | OUTPATIENT
Start: 2023-05-22 | End: 2023-05-24 | Stop reason: HOSPADM

## 2023-05-22 RX ORDER — INSULIN GLARGINE 100 [IU]/ML
14 INJECTION, SOLUTION SUBCUTANEOUS
Status: DISCONTINUED | OUTPATIENT
Start: 2023-05-22 | End: 2023-05-24 | Stop reason: HOSPADM

## 2023-05-22 RX ORDER — MIDODRINE HYDROCHLORIDE 5 MG/1
5 TABLET ORAL 2 TIMES DAILY
Status: DISCONTINUED | OUTPATIENT
Start: 2023-05-22 | End: 2023-05-22

## 2023-05-22 RX ORDER — FUROSEMIDE 10 MG/ML
40 INJECTION INTRAMUSCULAR; INTRAVENOUS ONCE
Status: COMPLETED | OUTPATIENT
Start: 2023-05-22 | End: 2023-05-22

## 2023-05-22 RX ORDER — ATORVASTATIN CALCIUM 40 MG/1
80 TABLET, FILM COATED ORAL DAILY
Status: DISCONTINUED | OUTPATIENT
Start: 2023-05-22 | End: 2023-05-24 | Stop reason: HOSPADM

## 2023-05-22 RX ORDER — CALCIUM GLUCONATE 20 MG/ML
1 INJECTION, SOLUTION INTRAVENOUS ONCE
Status: COMPLETED | OUTPATIENT
Start: 2023-05-22 | End: 2023-05-22

## 2023-05-22 RX ORDER — DULOXETIN HYDROCHLORIDE 60 MG/1
60 CAPSULE, DELAYED RELEASE ORAL DAILY
Status: DISCONTINUED | OUTPATIENT
Start: 2023-05-23 | End: 2023-05-24 | Stop reason: HOSPADM

## 2023-05-22 RX ORDER — FUROSEMIDE 10 MG/ML
60 INJECTION INTRAMUSCULAR; INTRAVENOUS ONCE
Status: DISCONTINUED | OUTPATIENT
Start: 2023-05-22 | End: 2023-05-22

## 2023-05-22 RX ORDER — CLOPIDOGREL BISULFATE 75 MG/1
75 TABLET ORAL DAILY
COMMUNITY

## 2023-05-22 RX ADMIN — PERFLUTREN 2 ML/MIN: 6.52 INJECTION, SUSPENSION INTRAVENOUS at 09:44

## 2023-05-22 RX ADMIN — CARVEDILOL 3.12 MG: 3.12 TABLET, FILM COATED ORAL at 16:20

## 2023-05-22 RX ADMIN — INSULIN LISPRO 2 UNITS: 100 INJECTION, SOLUTION INTRAVENOUS; SUBCUTANEOUS at 16:22

## 2023-05-22 RX ADMIN — PREGABALIN 100 MG: 100 CAPSULE ORAL at 09:30

## 2023-05-22 RX ADMIN — INSULIN LISPRO 6 UNITS: 100 INJECTION, SOLUTION INTRAVENOUS; SUBCUTANEOUS at 12:00

## 2023-05-22 RX ADMIN — SACUBITRIL AND VALSARTAN 1 TABLET: 24; 26 TABLET, FILM COATED ORAL at 13:23

## 2023-05-22 RX ADMIN — ATORVASTATIN CALCIUM 80 MG: 40 TABLET, FILM COATED ORAL at 11:51

## 2023-05-22 RX ADMIN — SACUBITRIL AND VALSARTAN 1 TABLET: 24; 26 TABLET, FILM COATED ORAL at 20:14

## 2023-05-22 RX ADMIN — FUROSEMIDE 60 MG: 10 INJECTION, SOLUTION INTRAVENOUS at 09:31

## 2023-05-22 RX ADMIN — FUROSEMIDE 40 MG: 10 INJECTION, SOLUTION INTRAVENOUS at 16:20

## 2023-05-22 RX ADMIN — CLOPIDOGREL BISULFATE 75 MG: 75 TABLET ORAL at 11:51

## 2023-05-22 RX ADMIN — CALCIUM GLUCONATE 1 G: 20 INJECTION, SOLUTION INTRAVENOUS at 11:29

## 2023-05-22 RX ADMIN — INSULIN LISPRO 2 UNITS: 100 INJECTION, SOLUTION INTRAVENOUS; SUBCUTANEOUS at 09:31

## 2023-05-22 RX ADMIN — MIDODRINE HYDROCHLORIDE 5 MG: 5 TABLET ORAL at 11:53

## 2023-05-22 RX ADMIN — DULOXETINE HYDROCHLORIDE 20 MG: 20 CAPSULE, DELAYED RELEASE ORAL at 09:30

## 2023-05-22 RX ADMIN — ENOXAPARIN SODIUM 30 MG: 30 INJECTION SUBCUTANEOUS at 09:30

## 2023-05-22 RX ADMIN — INSULIN GLARGINE 14 UNITS: 100 INJECTION, SOLUTION SUBCUTANEOUS at 21:14

## 2023-05-22 RX ADMIN — ALLOPURINOL 100 MG: 100 TABLET ORAL at 11:51

## 2023-05-22 RX ADMIN — ESCITALOPRAM OXALATE 5 MG: 5 TABLET, FILM COATED ORAL at 09:30

## 2023-05-22 NOTE — ASSESSMENT & PLAN NOTE
Lab Results   Component Value Date    EGFR 41 05/22/2023    EGFR 41 05/21/2023    CREATININE 1 34 (H) 05/22/2023    CREATININE 1 34 (H) 05/21/2023   Chronic anemia currently at baseline

## 2023-05-22 NOTE — ASSESSMENT & PLAN NOTE
No results found for: HGBA1C    Recent Labs     05/21/23  1601 05/21/23  2049 05/22/23  0719 05/22/23  1047   POCGLU 186* 174* 188* 281*       Blood Sugar Average: Last 72 hrs:  (P) 209 8   Placed on Lantus and insulin sliding scale regimen and diabetic diet

## 2023-05-22 NOTE — PLAN OF CARE
Problem: Potential for Falls  Goal: Patient will remain free of falls  Description: INTERVENTIONS:  - Educate patient/family on patient safety including physical limitations  - Instruct patient to call for assistance with activity   - Consult OT/PT to assist with strengthening/mobility   - Keep Call bell within reach  - Keep bed low and locked with side rails adjusted as appropriate  - Keep care items and personal belongings within reach  - Initiate and maintain comfort rounds  - Make Fall Risk Sign visible to staff  - Apply yellow socks and bracelet for high fall risk patients  - Consider moving patient to room near nurses station  Outcome: Progressing     Problem: CARDIOVASCULAR - ADULT  Goal: Maintains optimal cardiac output and hemodynamic stability  Description: INTERVENTIONS:  - Monitor I/O, vital signs and rhythm  - Monitor for S/S and trends of decreased cardiac output SOB BUSTOS swelling   - Administer and titrate ordered vasoactive medications to optimize hemodynamic stability  - Assess quality of pulses, skin color and temperature  - Assess for signs of decreased coronary artery perfusion  - Instruct patient to report change in severity of symptoms  Outcome: Progressing  Goal: Absence of cardiac dysrhythmias or at baseline rhythm  Description: INTERVENTIONS:  - Continuous cardiac monitoring, vital signs, obtain 12 lead EKG if ordered  - Administer antiarrhythmic and heart rate control medications as ordered  - Monitor electrolytes and administer replacement therapy as ordered  Outcome: Progressing     Problem: PAIN - ADULT  Goal: Verbalizes/displays adequate comfort level or baseline comfort level  Description: Interventions:  - Encourage patient to monitor pain and request assistance  - Assess pain using appropriate pain scale  - Administer analgesics based on type and severity of pain and evaluate response  - Implement non-pharmacological measures as appropriate and evaluate response  - Consider cultural and social influences on pain and pain management  - Notify physician/advanced practitioner if interventions unsuccessful or patient reports new pain  Outcome: Progressing     Problem: INFECTION - ADULT  Goal: Absence or prevention of progression during hospitalization  Description: INTERVENTIONS:  - Assess and monitor for signs and symptoms of infection  - Monitor lab/diagnostic results  - Monitor all insertion sites, i e  indwelling lines, tubes, and drains  - Monitor endotracheal if appropriate and nasal secretions for changes in amount and color  - [place] appropriate cooling/warming therapies per order  - Administer medications as ordered  - Instruct and encourage patient and family to use good hand hygiene technique  - Identify and instruct in appropriate isolation precautions for identified infection/condition  Outcome: Progressing  Goal: Absence of fever/infection during neutropenic period  Description: INTERVENTIONS:  - Monitor WBC    Outcome: Progressing     Problem: SAFETY ADULT  Goal: Patient will remain free of falls  Description: INTERVENTIONS:  - Educate patient/family on patient safety including physical limitations  - Instruct patient to call for assistance with activity   - Consult OT/PT to assist with strengthening/mobility   - Keep Call bell within reach  - Keep bed low and locked with side rails adjusted as appropriate  - Keep care items and personal belongings within reach  - Initiate and maintain comfort rounds  - Make Fall Risk Sign visible to staff  - Apply yellow socks and bracelet for high fall risk patients  - Consider moving patient to room near nurses station  Outcome: Progressing  Goal: Maintain or return to baseline ADL function  Description: INTERVENTIONS:  -  Assess patient's ability to carry out ADLs; assess patient's baseline for ADL function and identify physical deficits which impact ability to perform ADLs (bathing, care of mouth/teeth, toileting, grooming, dressing, etc )  - Assess/evaluate cause of self-care deficits   - Assess range of motion  - Assess patient's mobility; develop plan if impaired  - Assess patient's need for assistive devices and provide as appropriate  - Encourage maximum independence but intervene and supervise when necessary  - Involve family in performance of ADLs  - Assess for home care needs following discharge   - Consider OT consult to assist with ADL evaluation and planning for discharge  - Provide patient education as appropriate  Outcome: Progressing  Goal: Maintains/Returns to pre admission functional level  Description: INTERVENTIONS:  - Perform BMAT or MOVE assessment daily    - Set and communicate daily mobility goal to care team and patient/family/caregiver  - Collaborate with rehabilitation services on mobility goals if consulted  - Out of bed for toileting  - Record patient progress and toleration of activity level   Outcome: Progressing     Problem: DISCHARGE PLANNING  Goal: Discharge to home or other facility with appropriate resources  Description: INTERVENTIONS:  - Identify barriers to discharge w/patient and caregiver  - Arrange for needed discharge resources and transportation as appropriate  - Identify discharge learning needs (meds, wound care, etc )  - Arrange for interpretive services to assist at discharge as needed  - Refer to Case Management Department for coordinating discharge planning if the patient needs post-hospital services based on physician/advanced practitioner order or complex needs related to functional status, cognitive ability, or social support system  Outcome: Progressing     Problem: Knowledge Deficit  Goal: Patient/family/caregiver demonstrates understanding of disease process, treatment plan, medications, and discharge instructions  Description: Complete learning assessment and assess knowledge base    Interventions:  - Provide teaching at level of understanding  - Provide teaching via preferred learning methods  Outcome: Progressing

## 2023-05-22 NOTE — CONSULTS
Consultation - Nephrology   Juan Francisco Salguero 79 y o  female MRN: [id number]  Unit/Bed#: -01 Encounter: [id number]      A/P:  1  Chronic kidney disease stage IIIb/IV   - She is at her baseline creatinine at 1 34 mg/dL  -Etiology suspected to be secondary to diabetes, hypertension, ischemic cardiomyopathy   - She will continue to follow with her outpatient nephrologist on discharge  - Avoid nephrotoxins/contrast   I have reviewed her medication list    2  Secondary hyperparathyroidism of renal origin   - Was taking Sensipar 5 days a week   - Case discussed with Dr Jennifer Flores  This drug is causing hypocalcemia  Calcium is being repleted and cinacalcet has been held   - Calcitriol could be considered    3  Acute on chronic combined heart failure   - Due to sodium excess and fluid excess   - Continue diuresis  She is nonoliguric: 680/2100 (-1875)   - Continue IV furosemide to produce 1 to 2 L negative output per day   - Consider cardiology evaluation    4  Diabetes mellitus type 2 with long-term current use of insulin   - Sugars are being covered appropriately       Thank you for allowing us to participate in the care of your patient  Please feel free to contact us regarding the care of this patient, or any other questions/concerns that may be applicable      Patient Active Problem List   Diagnosis   • Anemia in chronic kidney disease   • Acute on chronic combined systolic and diastolic congestive heart failure (HCC)   • Type 2 diabetes mellitus without complication, with long-term current use of insulin ([address] )   • Essential hypertension   • Morbid obesity with BMI of 40 0-44 9, adult ([address] )   • Hypocalcemia   • Stage 3a chronic kidney disease ([address] )       History of Present Illness   Physician Requesting Consult: Ericka Green MD  Reason for Consult / Principal Problem: chronic kidney disease stage 3B/4/ acute on chronic combined HF/hypocalcemia  Hx and PE limited by:   HPI: Juan Francisco Salguero is a 79 y o  year old female who presents with worsening volume overload  The patient was enjoying in a high salt diet and increase fluid intake  She developed volume overload with worsening dyspnea on exertion, orthopnea and weakness  She presented with volume overload on her chest x-ray  We are consulted due to hypocalcemia and chronic kidney disease stage IIIb/IV  I reviewed care everywhere  She follows with Roney Velasquez of [facility] and saw him in March  I am excerpt in his note from her March visit  She has chronic kidney disease stage IIIb/IV and nonnephrotic proteinuria due to diabetes hypertension and ischemic cardiomyopathy  She has a baseline creatinine of 1 3-1 4  She has secondary hyperparathyroidism of renal origin and is to take Sensipar 5 days a week  The patient states that she has had diabetes for approximately 4 years  History obtained from chart review and the patient    Constitutional ROS- Denies fatigue, fever, chills, night sweats, has weight changes  HEENT ROS- Denies history of eye surgeries,  headaches  blurred vision     Endocrine ROS-4-year  history diabetes mellitus no  thyroid disease  Cardiovascular ROS- Denies chest pain, palpitation, dyspnea exertion, orthopnea, claudication  Pulmonary ROS- Denies history of COPD, asthma  Denies cough, hemoptysis, shortness of breath  GI ROS- Denies abdominal pain, diarrhea, nausea, swallowing problems, vomiting, constipation, blood in stools,  Hematological ROS- Denies history of easy bruising,   Genitourinary ROS- Denies difficulty urinating   Lymphatic ROS- Denies lymphadenopathy  Musculoskeletal ROS- Denies history of muscle weakness, joint pain  Dermatological ROS- Denies rash,   Psychiatric ROS- Denies anxiety, depression,   Neurological ROS- No stroke or TIA symptoms        Historical Information   Past Medical History:   Diagnosis Date   • Anemia    • Anxiety    • CHF (congestive heart failure) (HCC)    • Chronic kidney disease, stage 3 (HCC)    • Depression    • Diabetes mellitus ([facility] )    • Hyperlipemia    • Hypertension    • Ischemic cardiomyopathy    • MI (myocardial infarction) ([facility] )    • Pacemaker    • Peripheral polyneuropathy    • Sinus tachycardia      Past Surgical History:   Procedure Laterality Date   • CARDIAC PACEMAKER PLACEMENT     • CORONARY ANGIOPLASTY WITH STENT PLACEMENT       Social History   Social History     Substance and Sexual Activity   Alcohol Use Not Currently     Social History     Substance and Sexual Activity   Drug Use Never     Social History     Tobacco Use   Smoking Status Never   • Passive exposure: Never   Smokeless Tobacco Never     History reviewed  No pertinent family history      Meds/Allergies   all current active meds have been reviewed, current meds:   Current Facility-Administered Medications   Medication Dose Route Frequency   • acetaminophen (TYLENOL) tablet 650 mg  650 mg Oral Q6H PRN   • allopurinol (ZYLOPRIM) tablet 100 mg  100 mg Oral Daily   • atorvastatin (LIPITOR) tablet 80 mg  80 mg Oral Daily   • carvedilol (COREG) tablet 3 125 mg  3 125 mg Oral BID With Meals   • clopidogrel (PLAVIX) tablet 75 mg  75 mg Oral Daily   • [START ON 5/23/2023] DULoxetine (CYMBALTA) delayed release capsule 60 mg  60 mg Oral Daily   • enoxaparin (LOVENOX) subcutaneous injection 30 mg  30 mg Subcutaneous Q24H [facility]   • escitalopram (LEXAPRO) tablet 5 mg  5 mg Oral Daily   • furosemide (LASIX) injection 40 mg  40 mg Intravenous Once   • insulin glargine (LANTUS) subcutaneous injection 14 Units 0 14 mL  14 Units Subcutaneous HS   • insulin lispro (HumaLOG) 100 units/mL subcutaneous injection 2-12 Units  2-12 Units Subcutaneous TID AC   • pregabalin (LYRICA) capsule 100 mg  100 mg Oral Daily   • sacubitril-valsartan (ENTRESTO) 24-26 MG per tablet 1 tablet  1 tablet Oral BID    and PTA meds:    Medications Prior to Admission   Medication   • allopurinol (ZYLOPRIM) 100 mg tablet   • atorvastatin (LIPITOR) 80 mg tablet • carvedilol (COREG) 3 125 mg tablet   • cinacalcet (SENSIPAR) 30 mg tablet   • clopidogrel (PLAVIX) 75 mg tablet   • DULoxetine HCl 20 MG CSDR   • escitalopram (LEXAPRO) 10 mg tablet   • glipiZIDE (GLUCOTROL) 10 mg tablet   • Insulin Glargine Solostar 100 UNIT/ML SOPN   • midodrine (PROAMATINE) 5 mg tablet   • pregabalin (LYRICA) 100 mg capsule   • sacubitril-valsartan (Entresto) 24-26 MG TABS         No Known Allergies    Objective     Intake/Output Summary (Last 24 hours) at 5/22/2023 1550  Last data filed at 5/22/2023 1319  Gross per 24 hour   Intake 810 ml   Output 2225 ml   Net -1415 ml       Invasive Devices:        Physical Exam      I/O last 3 completed shifts: In: 680 [P O :580; IV Piggyback:100]  Out: 2100 [Urine:2100]    Vitals:    05/22/23 1432   BP: 150/90   Pulse: 91   Resp: 17   Temp: (!) 97 °F (36 1 °C)   SpO2: 94%       General Appearance:    No acute distress  Obese cooperative  Appears stated age  Head:    Normocephalic  Atraumatic  Normal jaw occlusion  Eyes:    Lids, conjunctiva normal  No scleral icterus  Ears:    Normal external ears  Nose:   Nares normal  No drainage  Mouth:   Lips, tongue normal  Mucosa normal  Phonation normal    Neck:   Supple  Symmetrical    Back:     Symmetric  No CVA tenderness  Lungs:     Normal respiratory effort  Clear to auscultation bilaterally  Chest wall:    No tenderness or deformity  Heart:    Regular rate and rhythm  Normal S1 and S2  No murmur  No JVD  No edema  Abdomen:     Soft  Non-tender  Bowel sounds active  Genitourinary:   No [place] catheter present  Extremities:   Extremities normal  Atraumatic  No cyanosis  Skin:   Warm and dry  No pallor, jaundice, rash, ecchymoses  Neurologic:   Alert and oriented to person, place, time  No focal deficit  Current Weight: Weight - Scale: 98 kg (216 lb)  First Weight: Weight - Scale: 97 5 kg (214 lb 15 2 oz)    Lab Results:  I have personally reviewed pertinent labs      CBC: Lab Results   Component Value Date    WBC 7 44 05/22/2023    HGB 11 3 (L) 05/22/2023    HCT 35 9 05/22/2023    MCV 88 05/22/2023     05/22/2023    MCH 27 8 05/22/2023    MCHC 31 5 05/22/2023    RDW 13 8 05/22/2023    MPV 10 9 05/22/2023     CMP:   Lab Results   Component Value Date    K 3 6 05/22/2023    CL 99 05/22/2023    CO2 25 05/22/2023    BUN 47 (H) 05/22/2023    CREATININE 1 34 (H) 05/22/2023    CALCIUM 7 3 (L) 05/22/2023    EGFR 41 05/22/2023     Phosphorus:   Lab Results   Component Value Date    PHOS 4 8 (H) 05/22/2023     Magnesium: No results found for: MG  Urinalysis: No results found for: COLORU, CLARITYU, SPECGRAV, PHUR, LEUKOCYTESUR, NITRITE, PROTEINUA, GLUCOSEU, KETONESU, BILIRUBINUR, BLOODU  Ionized Calcium: No results found for: CAION  Coagulation: No results found for: PT, INR, APTT  Troponin: No results found for: TROPONINI  ABG: No results found for: PHART, PSM4FBZ, PO2ART, PTP4WKD, Z4DGRRCH, BEART, SOURCE    Results from last 7 days   Lab Units 05/22/23  0445 05/21/23  0755   POTASSIUM mmol/L 3 6 4 5   CHLORIDE mmol/L 99 102   CO2 mmol/L 25 24   BUN mg/dL 47* 43*   CREATININE mg/dL 1 34* 1 34*   CALCIUM mg/dL 7 3* 6 9*   ALK PHOS U/L  --  101   ALT U/L  --  12   AST U/L  --  12*       Radiology review:  Procedure: XR chest 1 view portable    Result Date: 5/21/2023  Narrative: CHEST INDICATION:   pain  COMPARISON: 1/2/2019  EXAM PERFORMED/VIEWS:  XR CHEST PORTABLE FINDINGS: Dual-lead pacer with intact appearing leads  Mild cardiomegaly  Mild central vascular congestion  No pneumothorax or pleural effusion  Osseous structures appear within normal limits for patient age  Impression: Cardiomegaly with mild central vascular congestion  Workstation performed: [id number]         EKG, Pathology, and Other Studies: I personally reviewed the chest x-ray which demonstrates volume overload and a small right pleural effusion  I have reviewed all labs    I reviewed care everywhere and reviewed "Dr Aneudy Paz note of March 23rd for historical data      Counseling / Coordination of Care  Total ADDITIONAL floor / unit time spent today 45 minutes  Greater than 50% of total time was spent with the patient and / or family counseling and / or coordination of care  A description of the counseling / coordination of care: discussed with Dr Tez Lopez and the patient    Meño Mack MD      This consultation note was produced in part using a dictation device which may document imprecise wording from author's original intent      "

## 2023-05-22 NOTE — ASSESSMENT & PLAN NOTE
Lab Results   Component Value Date    EGFR 41 05/22/2023    EGFR 41 05/21/2023    CREATININE 1 34 (H) 05/22/2023    CREATININE 1 34 (H) 05/21/2023     Baseline Creatinine is 1 3-1 4  Currently creatinine is at baseline    Monitor with diuresis

## 2023-05-22 NOTE — PROGRESS NOTES
[address] [facility]  Progress Note  Name: Dago Aguirre  MRN: [id number]  Unit/Bed#: -01 I Date of Admission: 5/21/2023   Date of Service: 5/22/2023 I Hospital Day: 1    Assessment/Plan   * Acute on chronic combined systolic and diastolic congestive heart failure ([address] )  Assessment & Plan  Wt Readings from Last 3 Encounters:   05/22/23 98 kg (216 lb)   01/02/19 95 3 kg (210 lb)    known history of congestive heart failure however I could not find any documentation in her cardiologist or any previous 2D echoes for EF documentation  History of CAD s/p stent and also AICD  2D echo done 5/22 prelim ef of 31%  Recently diuretics were discontinued due to worsening creatinine  Presents with worsening shortness of breath and hypocalcemia  received Lasix 2 doses of 40 mg iv yesterday and will give 60 mg iv now and 40 mg in the afternoon restart Entresto  Check BMP tomorrow and volume status and will probably require Lasix oral on discharge        Hypocalcemia  Assessment & Plan  mayBe medication induced as patient is on Sensipar at home  We will replace with IV calcium and monitor for now  Ionized calcium is low  Check intact PTH and phosphorus level      Stage 3a chronic kidney disease [facility])  Assessment & Plan  Lab Results   Component Value Date    EGFR 41 05/22/2023    EGFR 41 05/21/2023    CREATININE 1 34 (H) 05/22/2023    CREATININE 1 34 (H) 05/21/2023     Baseline Creatinine is 1 3-1 4  Currently creatinine is at baseline  Monitor with diuresis    Morbid obesity with BMI of 40 0-44 9, adult ([facility])  Assessment & Plan  BMI is 40 61  counselled on diet exercise and lifestyle modification    Essential hypertension  Assessment & Plan  bp currently slightly elevated secondary to acute CHF exacerbation    Restart home meds and adjust further and diuresis  midodrine on hold for now    Type 2 diabetes mellitus without complication, with long-term current use of insulin ([facility])  Assessment & Plan  No results found for: HGBA1C    Recent Labs     23  1601 23  2049 23  0719 23  1047   POCGLU 186* 174* 188* 281*       Blood Sugar Average: Last 72 hrs:  (P) 209 8   Placed on Lantus and insulin sliding scale regimen and diabetic diet    Anemia in chronic kidney disease  Assessment & Plan  Lab Results   Component Value Date    EGFR 41 2023    EGFR 41 2023    CREATININE 1 34 (H) 2023    CREATININE 1 34 (H) 2023   Chronic anemia currently at baseline       VTE Pharmacologic Prophylaxis:   Pharmacologic: Enoxaparin (Lovenox)  Mechanical VTE Prophylaxis in Place: Yes    Patient Centered Rounds: I have performed bedside rounds with nursing staff today  Discussions with Specialists or Other Care Team Provider: none    Education and Discussions with Family / Patient: discussed with patient at bedside    Time Spent for Care: 30 minutes  More than 50% of total time spent on counseling and coordination of care as described above  Current Length of Stay: 1 day(s)    Current Patient Status: Inpatient   Certification Statement: The patient will continue to require additional inpatient hospital stay due to acute on Chronic combined systolic and diastolic heart failure    Discharge Plan:dc home in 1 day if bmp stable    Code Status: Level 1 - Full Code      Subjective:   States that she is starting to feel better her shortness of breath is slowly improving  Denies any chest pain or abdominal pain    Objective:     Vitals:   Temp (24hrs), Av 2 °F (36 2 °C), Min:97 °F (36 1 °C), Max:97 3 °F (36 3 °C)    Temp:  [97 °F (36 1 °C)-97 3 °F (36 3 °C)] 97 °F (36 1 °C)  HR:  [] 93  Resp:  [16-18] 18  BP: (154-159)/(95-99) 154/95  SpO2:  [92 %-95 %] 92 %  Body mass index is 40 81 kg/m²  Input and Output Summary (last 24 hours):        Intake/Output Summary (Last 24 hours) at 2023 1138  Last data filed at 2023 1128  Gross per 24 hour   Intake 870 ml   Output 2800 ml   Net -1930 ml       Physical Exam:     Physical Exam  Vitals and nursing note reviewed  Constitutional:       Appearance: Normal appearance  She is obese  HENT:      Head: Normocephalic and atraumatic  Right Ear: External ear normal       Left Ear: External ear normal       Nose: Nose normal       Mouth/Throat:      Pharynx: Oropharynx is clear  Eyes:      Pupils: Pupils are equal, round, and reactive to light  Cardiovascular:      Rate and Rhythm: Normal rate and regular rhythm  Heart sounds: Normal heart sounds  Pulmonary:      Effort: Pulmonary effort is normal       Breath sounds: Rales present  Abdominal:      General: Bowel sounds are normal       Palpations: Abdomen is soft  Tenderness: There is no abdominal tenderness  Musculoskeletal:         General: Normal range of motion  Cervical back: Normal range of motion and neck supple  Skin:     General: Skin is warm and dry  Capillary Refill: Capillary refill takes less than 2 seconds  Neurological:      General: No focal deficit present  Mental Status: She is alert and oriented to person, place, and time     Psychiatric:         Mood and Affect: Mood normal            Additional Data:     Labs:    Results from last 7 days   Lab Units 05/22/23  0445 05/21/23  0755   WBC Thousand/uL 7 44 8 51   HEMOGLOBIN g/dL 11 3* 11 2*   HEMATOCRIT % 35 9 35 1   PLATELETS Thousands/uL 269 278   NEUTROS PCT %  --  79*   LYMPHS PCT %  --  9*   MONOS PCT %  --  5   EOS PCT %  --  5     Results from last 7 days   Lab Units 05/22/23  0445 05/21/23  0755   SODIUM mmol/L 137 138   POTASSIUM mmol/L 3 6 4 5   CHLORIDE mmol/L 99 102   CO2 mmol/L 25 24   BUN mg/dL 47* 43*   CREATININE mg/dL 1 34* 1 34*   ANION GAP mmol/L 13 12   CALCIUM mg/dL 7 3* 6 9*   ALBUMIN g/dL  --  4 0   TOTAL BILIRUBIN mg/dL  --  0 47   ALK PHOS U/L  --  101   ALT U/L  --  12   AST U/L  --  12*   GLUCOSE RANDOM mg/dL 206* 264*         Results from last 7 days Lab Units 05/22/23  1047 05/22/23  0719 05/21/23  2049 05/21/23  1601 05/21/23  1115   POC GLUCOSE mg/dl 281* 188* 174* 186* 220*                   * I Have Reviewed All Lab Data Listed Above  * Additional Pertinent Lab Tests Reviewed: [facility] Admission Reviewed    Imaging:    Imaging Reports Reviewed Today Include: echo,cxray  Imaging Personally Reviewed by Myself Includes:  none    Recent Cultures (last 7 days):           Last 24 Hours Medication List:   Current Facility-Administered Medications   Medication Dose Route Frequency Provider Last Rate   • acetaminophen  650 mg Oral Q6H PRN Best Hayward MD     • allopurinol  100 mg Oral Daily Best Hayward MD     • atorvastatin  80 mg Oral Daily Best Hayward MD     • calcium gluconate  1 g Intravenous Once Best Hayward MD 1 g (05/22/23 1129)   • carvedilol  3 125 mg Oral BID With Meals Best Hayward MD     • clopidogrel  75 mg Oral Daily Best Hayward MD     • [START ON 5/23/2023] DULoxetine  60 mg Oral Daily Best Hayward MD     • enoxaparin  30 mg Subcutaneous Q24H [facility] Best Hayward MD     • escitalopram  5 mg Oral Daily Best Hayward MD     • furosemide  40 mg Intravenous Once Best Hayward MD     • insulin glargine  14 Units Subcutaneous HS Best Hayward MD     • insulin lispro  2-12 Units Subcutaneous TID AC Best Hayward MD     • pregabalin  100 mg Oral Daily Best Hayward MD     • sacubitril-valsartan  1 tablet Oral BID Best Hayward MD          Today, Patient Was Seen By: Best Hayward MD    ** Please Note: Dictation voice to text software may have been used in the creation of this document   **

## 2023-05-22 NOTE — CASE MANAGEMENT
Case Management Assessment & Discharge Planning Note    Patient name Shavon Hansen  Location [address] [id number]/-[id number] MRN [id number]  : 1955 Date 2023       Current Admission Date: 2023  Current Admission Diagnosis:Acute on chronic combined systolic and diastolic congestive heart failure [facility])   Patient Active Problem List    Diagnosis Date Noted   • Stage 3a chronic kidney disease ([facility] ) 2023   • Type 2 diabetes mellitus without complication, with long-term current use of insulin ([place] [address] ) 2023   • Morbid obesity with BMI of 40 0-44 9, adult ([place] [address] ) 2023   • Hypocalcemia 2023   • Anemia in chronic kidney disease 2020   • Acute on chronic combined systolic and diastolic congestive heart failure ([place] [address] ) 2020   • Essential hypertension 2012      LOS (days): 1  Geometric Mean LOS (GMLOS) (days):   Days to GMLOS:     OBJECTIVE:    Risk of Unplanned Readmission Score: 15 39         Current admission status: Inpatient       Preferred Pharmacy:   [address] [facility]  [address] [facility]  Phone: [phone] Fax: [phone]    [address] [facility] [address]  Phone: [phone] Fax: [phone]    Primary Care Provider: Lor Verde MD    Primary Insurance: Sussy Patel [facility]  Secondary Insurance: [address] [place] [address] Blvd:  [address] Proxies    There are no active Health Care Proxies on file         Advance Directives  Does patient have a [address] [facility] [address]?: No  Was patient offered paperwork?: Yes (patient declined)  Does patient currently have a Health Care decision maker?: Yes, please see Health Care Proxy section  Does patient have Advance Directives?: No  Was patient offered paperwork?: Yes (patient declined)  Primary Contact: Mariusz Salinas (Daughter)         Readmission Root Cause  30 Day Readmission: No    Patient Information  Admitted from[de-identified] Home  Mental Status: Alert  During Assessment patient was accompanied by: Friend  Assessment information provided by[de-identified] Patient  Primary Caregiver: [address] [facility] of Residence: [address] [place] [address] do you live in?: [address] entry access options   Select all that apply : Stairs  Number of steps to enter home : 10  Do the steps have railings?: Yes  Type of Current Residence: Apartment  Floor Level: 2  Upon entering residence, is there a bedroom on the main floor (no further steps)?: Yes  Upon entering residence, is there a bathroom on the main floor (no further steps)?: Yes  In the last 12 months, was there a time when you were not able to pay the mortgage or rent on time?: No  In the last 12 months, how many places have you lived?: 1  In the last 12 months, was there a time when you did not have a steady place to sleep or slept in a shelter (including now)?: No  Homeless/housing insecurity resource given?: N/A  Living Arrangements: Lives Alone  Is patient a ?: No    Activities of Daily Living Prior to Admission  Functional Status: Independent  Completes ADLs independently?: Yes  Ambulates independently?: Yes  Does patient use assisted devices?: No  Does patient currently own DME?: No  Does patient have a history of Outpatient Therapy (PT/OT)?: No  Does the patient have a history of Short-Term Rehab?: Yes [facility])  Does patient have a history of HHC?: No  Does patient currently have [facility]?: No         Patient Information Continued  Income Source: SSI/SSD  Does patient have prescription coverage?: Yes  Within the past 12 months, you worried that your food would run out before you got the money to buy more : Never true  Within the past 12 months, the food you bought just didn't last and you didn't have money to get more : Never true  Food insecurity resource given?: N/A  Does patient receive dialysis treatments?: No  Does patient have a history of substance abuse?: No  Does patient have a history of Mental Health Diagnosis?: No         Means of Transportation  Means of Transport to Appts[de-identified] Drives Self  In the past 12 months, has lack of transportation kept you from medical appointments or from getting medications?: No  In the past 12 months, has lack of transportation kept you from meetings, work, or from getting things needed for daily living?: No  Was application for public transport provided?: N/A        DISCHARGE DETAILS:    Discharge planning discussed with[de-identified] patient  Freedom of Choice: Yes     CM contacted family/caregiver?: No- see comments (patient declined)                  [address]         Is the patient interested in [place] [address] at discharge?: No    DME Referral Provided  Referral made for DME?: No         Would you like to participate in our [email or web address] service program?  : No - Declined    Treatment Team Recommendation: Home  Discharge Destination Plan[de-identified] Home  Transport at Discharge : Family                  CM met with patient at the bedside, baseline information was obtained  Male friend present during assessment  CM discussed the role of CM in helping the patient develop a discharge plan and assist the patient in carry out their plan  Patient lives in 2nd floor apartment, independent at baseline  CM to follow for CM discharge referral needs, if any

## 2023-05-22 NOTE — ASSESSMENT & PLAN NOTE
Wt Readings from Last 3 Encounters:   05/22/23 98 kg (216 lb)   01/02/19 95 3 kg (210 lb)    known history of congestive heart failure however I could not find any documentation in her cardiologist or any previous 2D echoes for EF documentation  History of CAD s/p stent and also AICD  2D echo done 5/22 prelim ef of 31%  Recently diuretics were discontinued due to worsening creatinine  Presents with worsening shortness of breath and hypocalcemia    received Lasix 2 doses of 40 mg iv yesterday and will give 60 mg iv now and 40 mg in the afternoon restart Entresto  Check BMP tomorrow and volume status and will probably require Lasix oral on discharge

## 2023-05-22 NOTE — ASSESSMENT & PLAN NOTE
bp currently slightly elevated secondary to acute CHF exacerbation    Restart home meds and adjust further and diuresis  midodrine on hold for now

## 2023-05-22 NOTE — UTILIZATION REVIEW
Initial Clinical Review    Admission: Date/Time/Statement:   Admission Orders (From admission, onward)     Ordered        05/21/23 0952  INPATIENT ADMISSION  Once                      Orders Placed This Encounter   Procedures   • INPATIENT ADMISSION     Standing Status:   Standing     Number of Occurrences:   1     Order Specific Question:   Level of Care     Answer:   Med Surg [16]     Order Specific Question:   Estimated length of stay     Answer:   More than 2 Midnights     Order Specific Question:   Certification     Answer:   I certify that inpatient services are medically necessary for this patient for a duration of greater than two midnights  See H&P and MD Progress Notes for additional information about the patient's course of treatment  ED Arrival Information     Expected   -    Arrival   5/21/2023 07:43    Acuity   Urgent            Means of arrival   Ambulance    Escorted by   [facility]    Service   Hospitalist    Admission type   Emergency            Arrival complaint   -           Chief Complaint   Patient presents with   • Shortness of Breath     Yesterday started feeling SOB with a dry cough, states felt better in air conditioning, denies fever, enroute received 1 albuterol tx with relief       Initial Presentation: 79 y o  female to ED via EMS from home  Present with a PMHX of CHF, CKD stage 3; DM, HLD, HTN, CAD s/p MI s/p stent and also AICD to ED with progressively worsening shortness of breath and dry cough for the last 3 to 4 days  Patient states that she used to be on water pills which were stopped due to her kidney numbers getting worse  Now she is having difficulty with dyspnea at rest and with exertion  Admitted to M/S with DX: Acute on chronic combined systolic and diastolic congestive heart failure   on exam: lungs with rales; BMI is 40 61  Neutro% 79; BUN 43; Cr 1 34 (Baseline 1 3-1 4);  Ca 6 9; AST 12; gluc 264;    CXR = Cardiomegaly with mild central vascular congestion  Given in ED Lasix iv; Ca Gluc iv  PLAN: rec'd additional Ca Gluc; rec'd additional lasix iv; monitor labs; I/O; daily wts; fluid restriction; Cardiopulmonary monitoring; Accuchecks with [facility]; f/u echo       Date: 5/22/23    Day 2  States that she is starting to feel better her shortness of breath is slowly improving  Lungs with rales; 2D echo done 5/22 prelim ef of 31%   I/O Net -1930; wT 216 LBS; BUN 47; Cr 1 34; Ca 7 3    Plan: LASIX 60 mg iv now and 40 mg in the afternoon monitor labs; I/O; daily wts; fluid restriction; Cardiopulmonary monitoring; Accuchecks with [facility]       ED Triage Vitals   Temperature Pulse Respirations Blood Pressure SpO2   05/21/23 0748 05/21/23 0748 05/21/23 0748 05/21/23 0748 05/21/23 0744   97 9 °F (36 6 °C) 97 20 153/87 94 %      Temp src Heart Rate Source Patient Position - Orthostatic VS BP Location FiO2 (%)   -- 05/21/23 0748 05/21/23 0748 05/21/23 0748 --    Monitor Lying Left arm       Pain Score       05/21/23 0748       No Pain          Wt Readings from Last 1 Encounters:   05/22/23 98 kg (216 lb)     Wt Readings from Last 3 Encounters:   05/21/23 97 5 kg (214 lb 15 2 oz)   01/02/19 95 3 kg (210 lb)           Additional Vital Signs:   Date/Time Temp Pulse Resp BP MAP (mmHg) SpO2 O2 Device Patient Position - Orthostatic VS   05/22/23 0803 -- 93 -- 154/95 -- 92 % -- --   05/22/23 07:18:25 97 °F (36 1 °C) Abnormal  105 18 154/95 115 93 % -- --   05/21/23 22:29:49 97 2 °F (36 2 °C) Abnormal  95 16 157/95 116 92 % -- --   05/21/23 14:27:27 97 3 °F (36 3 °C) Abnormal  102 18 159/99 119 95 % -- --   05/21/23 1030 -- -- -- -- -- -- None (Room air) --   05/21/23 10:25:47 97 3 °F (36 3 °C) Abnormal  103 20 161/100 120 94 % -- --   05/21/23 0915 -- 95 18 124/69 90 92 % -- --   05/21/23 0900 -- 95 20 121/68 88 93 % -- --   05/21/23 0830 -- 96 20 125/65 89 91 % None (Room air) --   05/21/23 0815 -- 92 20 137/72 99 91 % -- --   05/21/23 0748 97 9 °F (36 6 °C) 97 20 153/87 -- 94 % None (Room air) Lying   05/21/23 0744 -- -- -- -- -- 94 % -- --         EKG:     Pertinent Labs/Diagnostic Test Results:   XR chest 1 view portable   ED Interpretation by Joce Arguello MD (05/21 1576)   Pulmonary vascular congestion      Final Result by Erlin Marroquin MD (05/21 1228)      Cardiomegaly with mild central vascular congestion                    Workstation performed: [id number]               Results from last 7 days   Lab Units 05/22/23  0445 05/21/23  0755   WBC Thousand/uL 7 44 8 51   HEMOGLOBIN g/dL 11 3* 11 2*   HEMATOCRIT % 35 9 35 1   PLATELETS Thousands/uL 269 278   NEUTROS ABS Thousands/µL  --  6 80         Results from last 7 days   Lab Units 05/22/23  0445 05/21/23  1016 05/21/23  0755   SODIUM mmol/L 137  --  138   POTASSIUM mmol/L 3 6  --  4 5   CHLORIDE mmol/L 99  --  102   CO2 mmol/L 25  --  24   ANION GAP mmol/L 13  --  12   BUN mg/dL 47*  --  43*   CREATININE mg/dL 1 34*  --  1 34*   EGFR ml/min/1 73sq m 41  --  41   CALCIUM mg/dL 7 3*  --  6 9*   CALCIUM, IONIZED mmol/L 0 88* 0 89*  --      Results from last 7 days   Lab Units 05/21/23  0755   AST U/L 12*   ALT U/L 12   ALK PHOS U/L 101   TOTAL PROTEIN g/dL 6 9   ALBUMIN g/dL 4 0   TOTAL BILIRUBIN mg/dL 0 47     Results from last 7 days   Lab Units 05/22/23  1047 05/22/23  0719 05/21/23  2049 05/21/23  1601 05/21/23  1115   POC GLUCOSE mg/dl 281* 188* 174* 186* 220*     Results from last 7 days   Lab Units 05/22/23  0445 05/21/23  0755   GLUCOSE RANDOM mg/dL 206* 264*       Results from last 7 days   Lab Units 05/21/23  1149 05/21/23  1000 05/21/23  0755   HS TNI 0HR ng/L  --   --  17   HS TNI 2HR ng/L  --  16  --    HSTNI D2 ng/L  --  -1  --    HS TNI 4HR ng/L 19  --   --    HSTNI D4 ng/L 2  --   --        Results from last 7 days   Lab Units 05/22/23  0445   TSH 3RD GENERATON uIU/mL 2 052       Results from last 7 days   Lab Units 05/21/23  0755   BNP pg/mL 830*       ED Treatment:   Medication Administration from 05/21/2023 0743 to 05/21/2023 1022       Date/Time Order Dose Route Action     05/21/2023 0909 EDT furosemide (LASIX) injection 40 mg 40 mg Intravenous Given     05/21/2023 7907 EDT calcium gluconate 1 g in sodium chloride 0 9% 50 mL (premix) 1 g Intravenous New Bag          Present on Admission:  • Acute on chronic combined systolic and diastolic congestive heart failure (HCC)  • Anemia in chronic kidney disease  • Essential hypertension      Admitting Diagnosis: Hypocalcemia [E83 51]  SOB (shortness of breath) [R06 02]  Acute CHF (congestive heart failure) ([facility] [address] ) [I50 9]     Age/Sex: 79 y o  female     Admission Orders: SCDs; I/O; Daily wts; Consistent Carbohydrate Diet  Accu-checks ACHS  Fluid restriction 1800    Scheduled Medications:  calcium gluconate, 1 g, Intravenous, Once  DULoxetine, 20 mg, Oral, Daily  enoxaparin, 30 mg, Subcutaneous, Q24H JAX  escitalopram, 5 mg, Oral, Daily  ezetimibe, 10 mg, Oral, HS  furosemide, 60 mg, Intravenous, Once  insulin glargine, 10 Units, Subcutaneous, HS  insulin lispro, 2-12 Units, Subcutaneous, TID AC  pregabalin, 100 mg, Oral, Daily    calcium gluconate 1 g in sodium chloride 0 9% 50 mL (premix)  Dose: 1 g  Freq: Once Route: IV  Last Dose: Stopped (05/21/23 1300)  Start: 05/21/23 1215 End: 05/21/23 1300    furosemide (LASIX) injection 40 mg  Dose: 40 mg  Freq: Once Route: IV  Start: 05/21/23 1600 End: 05/21/23 1615    furosemide (LASIX) injection 60 mg  Dose: 60 mg  Freq: Once Route: IV  Start: 05/22/23 0930 End: 05/22/23 0931      Continuous IV Infusions: none     PRN Meds:  acetaminophen, 650 mg, Oral, Q6H PRN          Network Utilization Review Department  ATTENTION: Please call with any questions or concerns to [phone] and carefully listen to the prompts so that you are directed to the right person   All voicemails are confidential   Martomid Rain all requests for admission clinical reviews, approved or denied determinations and any other requests to dedicated fax number below belonging to the campus where the patient is receiving treatment   List of dedicated fax numbers for the Facilities:  [address] [place] [address] DENIALS (Administrative/Medical Necessity) [phone]   [address]  (Maternity/NICU/Pediatrics) [phone]   [address] [phone]   [place] [address] [phone]   [address] [place] [address] [facility] 28 [phone]   [facility] 134 [facility] [facility] [phone]

## 2023-05-22 NOTE — ASSESSMENT & PLAN NOTE
Wt Readings from Last 3 Encounters:   05/22/23 98 kg (216 lb)   01/02/19 95 3 kg (210 lb)    known history of congestive heart failure however I could not find any documentation in her cardiologist or any previous 2D echoes for EF documentation  History of CAD s/p stent and also AICD  2D echo done 5/22 prelim ef of 31%  Recently diuretics were discontinued due to worsening creatinine  Presents with worsening shortness of breath and hypocalcemia  received Lasix 2 doses of 40 mg iv yesterday and will give 60 mg iv now and 40 mg in the afternoon restart Entresto  Creatinine is trending up, diuretics remain on hold, follow repeat chest x-ray to determine if patient needs further diuretics or not    Nephrology consult appreciated

## 2023-05-22 NOTE — ASSESSMENT & PLAN NOTE
mayBe medication induced as patient is on Sensipar at home  We will replace with IV calcium and monitor for now  Ionized calcium is low    Check intact PTH and phosphorus level

## 2023-05-23 ENCOUNTER — APPOINTMENT (INPATIENT)
Dept: RADIOLOGY | Facility: HOSPITAL | Age: 68
End: 2023-05-23

## 2023-05-23 LAB
ANION GAP SERPL CALCULATED.3IONS-SCNC: 15 MMOL/L (ref 4–13)
BUN SERPL-MCNC: 57 MG/DL (ref 5–25)
CALCIUM SERPL-MCNC: 7.2 MG/DL (ref 8.4–10.2)
CHLORIDE SERPL-SCNC: 97 MMOL/L (ref 96–108)
CO2 SERPL-SCNC: 23 MMOL/L (ref 21–32)
CREAT SERPL-MCNC: 1.68 MG/DL (ref 0.6–1.3)
GFR SERPL CREATININE-BSD FRML MDRD: 31 ML/MIN/1.73SQ M
GLUCOSE SERPL-MCNC: 192 MG/DL (ref 65–140)
GLUCOSE SERPL-MCNC: 196 MG/DL (ref 65–140)
GLUCOSE SERPL-MCNC: 220 MG/DL (ref 65–140)
GLUCOSE SERPL-MCNC: 222 MG/DL (ref 65–140)
GLUCOSE SERPL-MCNC: 321 MG/DL (ref 65–140)
MAGNESIUM SERPL-MCNC: 1.2 MG/DL (ref 1.9–2.7)
POTASSIUM SERPL-SCNC: 4.1 MMOL/L (ref 3.5–5.3)
SODIUM SERPL-SCNC: 135 MMOL/L (ref 135–147)

## 2023-05-23 RX ORDER — CALCIUM CARBONATE 500 MG/1
750 TABLET, CHEWABLE ORAL
Status: DISCONTINUED | OUTPATIENT
Start: 2023-05-23 | End: 2023-05-24 | Stop reason: HOSPADM

## 2023-05-23 RX ADMIN — INSULIN LISPRO 2 UNITS: 100 INJECTION, SOLUTION INTRAVENOUS; SUBCUTANEOUS at 16:14

## 2023-05-23 RX ADMIN — SACUBITRIL AND VALSARTAN 1 TABLET: 24; 26 TABLET, FILM COATED ORAL at 08:23

## 2023-05-23 RX ADMIN — INSULIN GLARGINE 14 UNITS: 100 INJECTION, SOLUTION SUBCUTANEOUS at 21:26

## 2023-05-23 RX ADMIN — PREGABALIN 100 MG: 100 CAPSULE ORAL at 08:21

## 2023-05-23 RX ADMIN — INSULIN LISPRO 8 UNITS: 100 INJECTION, SOLUTION INTRAVENOUS; SUBCUTANEOUS at 11:30

## 2023-05-23 RX ADMIN — ATORVASTATIN CALCIUM 80 MG: 40 TABLET, FILM COATED ORAL at 08:20

## 2023-05-23 RX ADMIN — CLOPIDOGREL BISULFATE 75 MG: 75 TABLET ORAL at 08:20

## 2023-05-23 RX ADMIN — SACUBITRIL AND VALSARTAN 1 TABLET: 24; 26 TABLET, FILM COATED ORAL at 18:08

## 2023-05-23 RX ADMIN — ALLOPURINOL 100 MG: 100 TABLET ORAL at 08:20

## 2023-05-23 RX ADMIN — CALCIUM CARBONATE (ANTACID) CHEW TAB 500 MG 750 MG: 500 CHEW TAB at 16:14

## 2023-05-23 RX ADMIN — CARVEDILOL 3.12 MG: 3.12 TABLET, FILM COATED ORAL at 08:20

## 2023-05-23 RX ADMIN — INSULIN LISPRO 4 UNITS: 100 INJECTION, SOLUTION INTRAVENOUS; SUBCUTANEOUS at 08:25

## 2023-05-23 RX ADMIN — ENOXAPARIN SODIUM 30 MG: 30 INJECTION SUBCUTANEOUS at 08:21

## 2023-05-23 RX ADMIN — DULOXETINE HYDROCHLORIDE 60 MG: 60 CAPSULE, DELAYED RELEASE ORAL at 08:20

## 2023-05-23 RX ADMIN — ESCITALOPRAM OXALATE 5 MG: 5 TABLET, FILM COATED ORAL at 08:21

## 2023-05-23 NOTE — PROGRESS NOTES
[address]  Progress Note  Name: Cynthia Barriga  MRN: [id number]  Unit/Bed#: -01 I Date of Admission: 5/21/2023   Date of Service: 5/23/2023 I Hospital Day: 2    Assessment/Plan   * Acute on chronic combined systolic and diastolic congestive heart failure ([address] )  Assessment & Plan  Wt Readings from Last 3 Encounters:   05/22/23 98 kg (216 lb)   01/02/19 95 3 kg (210 lb)    known history of congestive heart failure however I could not find any documentation in her cardiologist or any previous 2D echoes for EF documentation  History of CAD s/p stent and also AICD  2D echo done 5/22 prelim ef of 31%  Recently diuretics were discontinued due to worsening creatinine  Presents with worsening shortness of breath and hypocalcemia  received Lasix 2 doses of 40 mg iv yesterday and will give 60 mg iv now and 40 mg in the afternoon restart Entresto  Creatinine is trending up, diuretics remain on hold, follow repeat chest x-ray to determine if patient needs further diuretics or not  Nephrology consult appreciated        Stage 3a chronic kidney disease [facility])  Assessment & Plan  Lab Results   Component Value Date    EGFR 31 05/23/2023    EGFR 41 05/22/2023    EGFR 41 05/21/2023    CREATININE 1 68 (H) 05/23/2023    CREATININE 1 34 (H) 05/22/2023    CREATININE 1 34 (H) 05/21/2023     Baseline Creatinine is 1 3-1 4  Currently creatinine is at baseline  Monitor with diuresis    Hypocalcemia  Assessment & Plan  mayBe medication induced as patient is on Sensipar at home  We will replace with IV calcium and monitor for now  Ionized calcium is low  Check intact PTH and phosphorus level      Morbid obesity with BMI of 40 0-44 9, adult (HCC)  Assessment & Plan  BMI is 40 61  counselled on diet exercise and lifestyle modification    Essential hypertension  Assessment & Plan  bp currently slightly elevated secondary to acute CHF exacerbation    Restart home meds and adjust further and diuresis  midodrine on hold for now    Type 2 diabetes mellitus without complication, with long-term current use of insulin [facility])  Assessment & Plan  No results found for: HGBA1C    Recent Labs     05/22/23  2057 05/23/23  0716 05/23/23  1058 05/23/23  1539   POCGLU 222* 222* 321* 192*       Blood Sugar Average: Last 72 hrs:  (P) 220 1   Placed on Lantus and insulin sliding scale regimen and diabetic diet    Anemia in chronic kidney disease  Assessment & Plan  Lab Results   Component Value Date    EGFR 31 05/23/2023    EGFR 41 05/22/2023    EGFR 41 05/21/2023    CREATININE 1 68 (H) 05/23/2023    CREATININE 1 34 (H) 05/22/2023    CREATININE 1 34 (H) 05/21/2023   Creatinine trending up, patient received 1 dose of Lasix yesterday,-we will give diuretic holiday today and check chest x-ray to evaluate if patient needs further diuretics  VTE Pharmacologic Prophylaxis: VTE Score: 2 Moderate Risk (Score 3-4) - Pharmacological DVT Prophylaxis Ordered: enoxaparin (Lovenox)  Patient Centered Rounds: I performed bedside rounds with nursing staff today  Discussions with Specialists or Other Care Team Provider: Nephrology    Education and Discussions with Family / Patient: Updated  (wife) at bedside  Total Time Spent on Date of Encounter in care of patient: 10 minutes This time was spent on one or more of the following: performing physical exam; counseling and coordination of care; obtaining or reviewing history; documenting in the medical record; reviewing/ordering tests, medications or procedures; communicating with other healthcare professionals and discussing with patient's family/caregivers  Current Length of Stay: 2 day(s)  Current Patient Status: Inpatient   Certification Statement: The patient will continue to require additional inpatient hospital stay due to To monitor above condition  Discharge Plan: Anticipate discharge in 24-48 hrs to home      Code Status: Level 1 - Full Code    Subjective:   Seen and evaluated during the event  Resting comfortably  Denies any significant complaint  Objective:     Vitals:   Temp (24hrs), Av 1 °F (36 2 °C), Min:97 °F (36 1 °C), Max:97 2 °F (36 2 °C)    Temp:  [97 °F (36 1 °C)-97 2 °F (36 2 °C)] 97 °F (36 1 °C)  HR:  [86-93] 86  Resp:  [16-18] 17  BP: ()/(57-89) 95/58  SpO2:  [91 %-93 %] 93 %  Body mass index is 40 51 kg/m²  Input and Output Summary (last 24 hours): Intake/Output Summary (Last 24 hours) at 2023 1656  Last data filed at 2023 1545  Gross per 24 hour   Intake 780 ml   Output 600 ml   Net 180 ml       Physical Exam:   Physical Exam  Vitals and nursing note reviewed  Constitutional:       Appearance: Normal appearance  She is obese  She is not ill-appearing or diaphoretic  HENT:      Head: Normocephalic  Mouth/Throat:      Mouth: Mucous membranes are moist       Pharynx: Oropharynx is clear  No oropharyngeal exudate  Eyes:      General:         Left eye: No discharge  Extraocular Movements: Extraocular movements intact  Conjunctiva/sclera: Conjunctivae normal       Pupils: Pupils are equal, round, and reactive to light  Cardiovascular:      Rate and Rhythm: Normal rate  Heart sounds: No murmur heard  No friction rub  No gallop  Pulmonary:      Effort: Pulmonary effort is normal  No respiratory distress  Breath sounds: No stridor  No wheezing or rhonchi  Abdominal:      General: Abdomen is flat  Bowel sounds are normal  There is distension  Palpations: There is no mass  Tenderness: There is no abdominal tenderness  Hernia: No hernia is present  Musculoskeletal:      Cervical back: Normal range of motion  No rigidity  Lymphadenopathy:      Cervical: No cervical adenopathy  Skin:     General: Skin is warm  Capillary Refill: Capillary refill takes less than 2 seconds  Coloration: Skin is not jaundiced or pale        Findings: No bruising or erythema  Neurological:      General: No focal deficit present  Mental Status: She is alert and oriented to person, place, and time  Cranial Nerves: No cranial nerve deficit  Sensory: No sensory deficit  Motor: No weakness  Coordination: Coordination normal          Additional Data:     Labs:  Results from last 7 days   Lab Units 05/22/23  0445 05/21/23  0755   WBC Thousand/uL 7 44 8 51   HEMOGLOBIN g/dL 11 3* 11 2*   HEMATOCRIT % 35 9 35 1   PLATELETS Thousands/uL 269 278   NEUTROS PCT %  --  79*   LYMPHS PCT %  --  9*   MONOS PCT %  --  5   EOS PCT %  --  5     Results from last 7 days   Lab Units 05/23/23  0430 05/22/23  0445 05/21/23  0755   SODIUM mmol/L 135   < > 138   POTASSIUM mmol/L 4 1   < > 4 5   CHLORIDE mmol/L 97   < > 102   CO2 mmol/L 23   < > 24   BUN mg/dL 57*   < > 43*   CREATININE mg/dL 1 68*   < > 1 34*   ANION GAP mmol/L 15*   < > 12   CALCIUM mg/dL 7 2*   < > 6 9*   ALBUMIN g/dL  --   --  4 0   TOTAL BILIRUBIN mg/dL  --   --  0 47   ALK PHOS U/L  --   --  101   ALT U/L  --   --  12   AST U/L  --   --  12*   GLUCOSE RANDOM mg/dL 220*   < > 264*    < > = values in this interval not displayed  Results from last 7 days   Lab Units 05/23/23  1539 05/23/23  1058 05/23/23  0716 05/22/23  2057 05/22/23  1604 05/22/23  1047 05/22/23  0719 05/21/23  2049 05/21/23  1601 05/21/23  1115   POC GLUCOSE mg/dl 192* 321* 222* 222* 195* 281* 188* 174* 186* 220*               Lines/Drains:  Invasive Devices     Peripheral Intravenous Line  Duration           Peripheral IV 05/22/23 Distal;Dorsal (posterior); Right Forearm 1 day                      Imaging: No pertinent imaging reviewed      Recent Cultures (last 7 days):         Last 24 Hours Medication List:   Current Facility-Administered Medications   Medication Dose Route Frequency Provider Last Rate   • acetaminophen  650 mg Oral Q6H PRN Autumn Garcia MD     • allopurinol  100 mg Oral Daily Autumn Garcia MD     • atorvastatin  80 mg Oral Daily Russel Blancas MD     • calcium carbonate  750 mg Oral TID Jean Smalls MD     • carvedilol  3 125 mg Oral BID With Meals Russel Blancas MD     • clopidogrel  75 mg Oral Daily Russel Blancas MD     • DULoxetine  60 mg Oral Daily Russel Blancas MD     • enoxaparin  30 mg Subcutaneous Q24H [facility] Russel Blancas MD     • escitalopram  5 mg Oral Daily Russel Blancas MD     • insulin glargine  14 Units Subcutaneous HS Russel Blancas MD     • insulin lispro  2-12 Units Subcutaneous TID AC Russel Blancas MD     • pregabalin  100 mg Oral Daily Russel Blancas MD     • sacubitril-valsartan  1 tablet Oral BID Russel Blancas MD          Today, Patient Was Seen By: Darlin Grover MD    **Please Note: This note may have been constructed using a voice recognition system  **

## 2023-05-23 NOTE — PLAN OF CARE
Problem: Potential for Falls  Goal: Patient will remain free of falls  Description: INTERVENTIONS:  - Educate patient/family on patient safety including physical limitations  - Instruct patient to call for assistance with activity   - Consult OT/PT to assist with strengthening/mobility   - Keep Call bell within reach  - Keep bed low and locked with side rails adjusted as appropriate  - Keep care items and personal belongings within reach  - Initiate and maintain comfort rounds  - Make Fall Risk Sign visible to staff  - Apply yellow socks and bracelet for high fall risk patients  - Consider moving patient to room near nurses station  Outcome: Progressing     Problem: CARDIOVASCULAR - ADULT  Goal: Maintains optimal cardiac output and hemodynamic stability  Description: INTERVENTIONS:  - Monitor I/O, vital signs and rhythm  - Monitor for S/S and trends of decreased cardiac output SOB BUSTOS swelling   - Administer and titrate ordered vasoactive medications to optimize hemodynamic stability  - Assess quality of pulses, skin color and temperature  - Assess for signs of decreased coronary artery perfusion  - Instruct patient to report change in severity of symptoms  Outcome: Progressing  Goal: Absence of cardiac dysrhythmias or at baseline rhythm  Description: INTERVENTIONS:  - Continuous cardiac monitoring, vital signs, obtain 12 lead EKG if ordered  - Administer antiarrhythmic and heart rate control medications as ordered  - Monitor electrolytes and administer replacement therapy as ordered  Outcome: Progressing     Problem: PAIN - ADULT  Goal: Verbalizes/displays adequate comfort level or baseline comfort level  Description: Interventions:  - Encourage patient to monitor pain and request assistance  - Assess pain using appropriate pain scale  - Administer analgesics based on type and severity of pain and evaluate response  - Implement non-pharmacological measures as appropriate and evaluate response  - Consider cultural and social influences on pain and pain management  - Notify physician/advanced practitioner if interventions unsuccessful or patient reports new pain  Outcome: Progressing     Problem: INFECTION - ADULT  Goal: Absence or prevention of progression during hospitalization  Description: INTERVENTIONS:  - Assess and monitor for signs and symptoms of infection  - Monitor lab/diagnostic results  - Monitor all insertion sites, i e  indwelling lines, tubes, and drains  - Monitor endotracheal if appropriate and nasal secretions for changes in amount and color  - [place] appropriate cooling/warming therapies per order  - Administer medications as ordered  - Instruct and encourage patient and family to use good hand hygiene technique  - Identify and instruct in appropriate isolation precautions for identified infection/condition  Outcome: Progressing  Goal: Absence of fever/infection during neutropenic period  Description: INTERVENTIONS:  - Monitor WBC    Outcome: Progressing     Problem: SAFETY ADULT  Goal: Patient will remain free of falls  Description: INTERVENTIONS:  - Educate patient/family on patient safety including physical limitations  - Instruct patient to call for assistance with activity   - Consult OT/PT to assist with strengthening/mobility   - Keep Call bell within reach  - Keep bed low and locked with side rails adjusted as appropriate  - Keep care items and personal belongings within reach  - Initiate and maintain comfort rounds  - Make Fall Risk Sign visible to staff  - Apply yellow socks and bracelet for high fall risk patients  - Consider moving patient to room near nurses station  Outcome: Progressing  Goal: Maintain or return to baseline ADL function  Description: INTERVENTIONS:  -  Assess patient's ability to carry out ADLs; assess patient's baseline for ADL function and identify physical deficits which impact ability to perform ADLs (bathing, care of mouth/teeth, toileting, grooming, dressing, etc )  - Assess/evaluate cause of self-care deficits   - Assess range of motion  - Assess patient's mobility; develop plan if impaired  - Assess patient's need for assistive devices and provide as appropriate  - Encourage maximum independence but intervene and supervise when necessary  - Involve family in performance of ADLs  - Assess for home care needs following discharge   - Consider OT consult to assist with ADL evaluation and planning for discharge  - Provide patient education as appropriate  Outcome: Progressing  Goal: Maintains/Returns to pre admission functional level  Description: INTERVENTIONS:  - Perform BMAT or MOVE assessment daily    - Set and communicate daily mobility goal to care team and patient/family/caregiver  - Collaborate with rehabilitation services on mobility goals if consulted  - Out of bed for toileting  - Record patient progress and toleration of activity level   Outcome: Progressing     Problem: DISCHARGE PLANNING  Goal: Discharge to home or other facility with appropriate resources  Description: INTERVENTIONS:  - Identify barriers to discharge w/patient and caregiver  - Arrange for needed discharge resources and transportation as appropriate  - Identify discharge learning needs (meds, wound care, etc )  - Arrange for interpretive services to assist at discharge as needed  - Refer to Case Management Department for coordinating discharge planning if the patient needs post-hospital services based on physician/advanced practitioner order or complex needs related to functional status, cognitive ability, or social support system  Outcome: Progressing     Problem: Knowledge Deficit  Goal: Patient/family/caregiver demonstrates understanding of disease process, treatment plan, medications, and discharge instructions  Description: Complete learning assessment and assess knowledge base    Interventions:  - Provide teaching at level of understanding  - Provide teaching via preferred learning methods  Outcome: Progressing

## 2023-05-23 NOTE — ASSESSMENT & PLAN NOTE
Lab Results   Component Value Date    EGFR 31 05/23/2023    EGFR 41 05/22/2023    EGFR 41 05/21/2023    CREATININE 1 68 (H) 05/23/2023    CREATININE 1 34 (H) 05/22/2023    CREATININE 1 34 (H) 05/21/2023     Baseline Creatinine is 1 3-1 4  Currently creatinine is at baseline    Monitor with diuresis

## 2023-05-23 NOTE — ASSESSMENT & PLAN NOTE
Lab Results   Component Value Date    EGFR 31 05/23/2023    EGFR 41 05/22/2023    EGFR 41 05/21/2023    CREATININE 1 68 (H) 05/23/2023    CREATININE 1 34 (H) 05/22/2023    CREATININE 1 34 (H) 05/21/2023   Creatinine trending up, patient received 1 dose of Lasix yesterday,-we will give diuretic holiday today and check chest x-ray to evaluate if patient needs further diuretics

## 2023-05-23 NOTE — PROGRESS NOTES
Progress Note - Nephrology   Shavon Hansen 79 y o  female MRN: [id number]  Unit/Bed#: -01 Encounter: [id number]    A/P:  1  Chronic kidney disease stage IIIb/IV   - Creatinine had been at 1 34 mg/dL yesterday however trended up to 1 68 mg/dL today  - Nonoliguric: 710/925  Weight suggests a 6 pound weight loss on a standing scale in 2 days  - Check a chest x-ray to evaluate need for further diuretic therapy  Oxygen saturation is adequate and she has trace edema   - Did not receive Lasix today    2  Acute on chronic combined systolic and diastolic failure   - Ejection fraction is 30% with severe global hypokinesis  She also has mild diastolic dysfunction grade 1  She is at high risk for cardiorenal syndrome and needs to have daily weights and weight guided diuretic therapy    3  Secondary hyperparathyroidism of renal origin   - Developed hypocalcemia with Sensipar     -Start Tums 1 hour before meals for elevated calcium level  She will discuss this with her primary nephrologist    4  Diabetes mellitus type 2 with long-term current use of insulin   - Sugars are being covered appropriately    5  Cramping of hands and feet   - Check a magnesium level      Follow up reason for today's visit: Chronic kidney disease stage IIIb/IV/acute on chronic diastolic and systolic heart failure    Acute on chronic combined systolic and diastolic congestive heart failure [facility])    Patient Active Problem List   Diagnosis   • Anemia in chronic kidney disease   • Acute on chronic combined systolic and diastolic congestive heart failure (HCC)   • Type 2 diabetes mellitus without complication, with long-term current use of insulin ([facility])   • Essential hypertension   • Morbid obesity with BMI of 40 0-44 9, adult ([facility])   • Hypocalcemia   • Stage 3a chronic kidney disease (HCC)         Subjective:   A 10 point review of systems is negative  She said she has decreased shortness of breath    She does have cramping of her hands "and feet    Objective:     Vitals: Blood pressure 92/57, pulse 87, temperature (!) 97 °F (36 1 °C), resp  rate 17, height 5' 1\" (1 549 m), weight 97 3 kg (214 lb 6 4 oz), SpO2 93 %  ,Body mass index is 40 51 kg/m²  Weight (last 2 days)     Date/Time Weight    05/23/23 0530 97 3 (214 4)    05/23/23 0430 97 3 (214 4)    05/22/23 0803 98 (216)    05/22/23 0544 98 2 (216 4)    05/22/23 0452 98 2 (216 4)    05/21/23 1156 99 9 (220 2)    05/21/23 0748 97 5 (214 95)            Intake/Output Summary (Last 24 hours) at 5/23/2023 1523  Last data filed at 5/23/2023 0900  Gross per 24 hour   Intake 480 ml   Output 600 ml   Net -120 ml     I/O last 3 completed shifts:   In: 80 [P O :880; IV Piggyback:50]  Out: 8807 [Urine:1725]         Physical Exam: BP 92/57   Pulse 87   Temp (!) 97 °F (36 1 °C)   Resp 17   Ht 5' 1\" (1 549 m)   Wt 97 3 kg (214 lb 6 4 oz)   SpO2 93%   BMI 40 51 kg/m²     General Appearance:    Alert, cooperative, no distress, appears stated age   Head:    Normocephalic, without obvious abnormality, atraumatic   Eyes:    Conjunctiva/corneas clear   Ears:    Normal external ears   Nose:   Nares normal, septum midline, mucosa normal, no drainage    or sinus tenderness   Throat:   Lips, mucosa, and tongue normal; teeth and gums normal   Neck:   Supple, symmetrical, trachea midline, no adenopathy;        thyroid:  No enlargement/tenderness/nodules; no carotid    bruit or JVD   Back:     Symmetric, no curvature, ROM normal, no CVA tenderness   Lungs:     Clear to auscultation bilaterally, respirations unlabored   Chest wall:    No tenderness or deformity   Heart:    Regular rate and rhythm, S1 and S2 normal, no murmur, rub   or gallop   Abdomen:     Soft, non-tender, bowel sounds active   Extremities:   Extremities normal, atraumatic, no cyanosis or edema   Skin:   Skin color, texture, turgor normal, no rashes or lesions   Lymph nodes:   Cervical normal   Neurologic:   CNII-XII intact            Lab, Imaging " and other studies: I have personally reviewed pertinent labs  CBC: No results found for: WBC, HGB, HCT, MCV, PLT, ADJUSTEDWBC, MCH, MCHC, RDW, MPV, NRBC  CMP:   Lab Results   Component Value Date    K 4 1 05/23/2023    CL 97 05/23/2023    CO2 23 05/23/2023    BUN 57 (H) 05/23/2023    CREATININE 1 68 (H) 05/23/2023    CALCIUM 7 2 (L) 05/23/2023    EGFR 31 05/23/2023         Results from last 7 days   Lab Units 05/23/23  0430 05/22/23  0445 05/21/23  0755   POTASSIUM mmol/L 4 1 3 6 4 5   CHLORIDE mmol/L 97 99 102   CO2 mmol/L 23 25 24   BUN mg/dL 57* 47* 43*   CREATININE mg/dL 1 68* 1 34* 1 34*   CALCIUM mg/dL 7 2* 7 3* 6 9*   ALK PHOS U/L  --   --  101   ALT U/L  --   --  12   AST U/L  --   --  12*         Phosphorus: No results found for: PHOS  Magnesium: No results found for: MG  Urinalysis: No results found for: COLORU, CLARITYU, SPECGRAV, PHUR, LEUKOCYTESUR, NITRITE, PROTEINUA, GLUCOSEU, KETONESU, BILIRUBINUR, BLOODU  Ionized Calcium: No results found for: CAION  Coagulation: No results found for: PT, INR, APTT  Troponin: No results found for: TROPONINI  ABG: No results found for: PHART, YLX0QEK, PO2ART, TZQ1ZEX, V4YDLTVZ, BEART, SOURCE  Radiology review:     IMAGING  Procedure: XR chest 1 view portable    Result Date: 5/21/2023  Narrative: CHEST INDICATION:   pain  COMPARISON: 1/2/2019  EXAM PERFORMED/VIEWS:  XR CHEST PORTABLE FINDINGS: Dual-lead pacer with intact appearing leads  Mild cardiomegaly  Mild central vascular congestion  No pneumothorax or pleural effusion  Osseous structures appear within normal limits for patient age  Impression: Cardiomegaly with mild central vascular congestion  Workstation performed: [id number]     Procedure: Echo complete w/ contrast if indicated    Result Date: 5/22/2023  Narrative: •  Left Ventricle: Left ventricular cavity size is upper normal  Wall thickness is normal  The left ventricular ejection fraction is 30%  Systolic function is severely reduced   There is severe global hypokinesis with specific regional wall abnormalities (apical akinesis)  Diastolic function is mildly abnormal, consistent with grade I (abnormal) relaxation  Left atrial filling pressure is elevated  •  Right Ventricle: Right ventricular cavity size is normal  Systolic function is normal  A pacer wire is present  •  Right Atrium: The atrium is normal in size  A pacer wire is present  •  Mitral Valve: There is mild regurgitation  There is no evidence of stenosis  •  Tricuspid Valve: There is trace regurgitation  There is no evidence of stenosis  •  Pulmonic Valve: There is trace regurgitation  There is no evidence of stenosis  •  No prior study available for comparison         Current Facility-Administered Medications   Medication Dose Route Frequency   • acetaminophen (TYLENOL) tablet 650 mg  650 mg Oral Q6H PRN   • allopurinol (ZYLOPRIM) tablet 100 mg  100 mg Oral Daily   • atorvastatin (LIPITOR) tablet 80 mg  80 mg Oral Daily   • calcium carbonate (TUMS) chewable tablet 750 mg  750 mg Oral TID AC   • carvedilol (COREG) tablet 3 125 mg  3 125 mg Oral BID With Meals   • clopidogrel (PLAVIX) tablet 75 mg  75 mg Oral Daily   • DULoxetine (CYMBALTA) delayed release capsule 60 mg  60 mg Oral Daily   • enoxaparin (LOVENOX) subcutaneous injection 30 mg  30 mg Subcutaneous Q24H JAX   • escitalopram (LEXAPRO) tablet 5 mg  5 mg Oral Daily   • insulin glargine (LANTUS) subcutaneous injection 14 Units 0 14 mL  14 Units Subcutaneous HS   • insulin lispro (HumaLOG) 100 units/mL subcutaneous injection 2-12 Units  2-12 Units Subcutaneous TID AC   • pregabalin (LYRICA) capsule 100 mg  100 mg Oral Daily   • sacubitril-valsartan (ENTRESTO) 24-26 MG per tablet 1 tablet  1 tablet Oral BID     Medications Discontinued During This Encounter   Medication Reason   • albuterol (PROAIR HFA) 90 mcg/act inhaler    • benzonatate (TESSALON) 200 MG capsule    • fenofibrate (TRIGLIDE) 160 MG tablet    • sitaGLIPtin (JANUVIA) 100 mg tablet    • furosemide (LASIX) injection 40 mg    • amitriptyline (ELAVIL) 25 mg tablet Error   • losartan (COZAAR) 100 MG tablet Error   • ezetimibe (ZETIA) 10 mg tablet Error   • ezetimibe (ZETIA) tablet 10 mg    • DULoxetine (CYMBALTA) delayed release capsule 20 mg    • furosemide (LASIX) injection 60 mg    • insulin glargine (LANTUS) subcutaneous injection 10 Units 0 1 mL    • midodrine (PROAMATINE) tablet 5 mg        Viri Mclain MD      This progress note was produced in part using a dictation device which may document imprecise wording from author's original intent

## 2023-05-23 NOTE — ASSESSMENT & PLAN NOTE
No results found for: HGBA1C    Recent Labs     05/22/23 2057 05/23/23  0716 05/23/23  1058 05/23/23  1539   POCGLU 222* 222* 321* 192*       Blood Sugar Average: Last 72 hrs:  (P) 220 1   Placed on Lantus and insulin sliding scale regimen and diabetic diet

## 2023-05-24 VITALS
HEART RATE: 80 BPM | WEIGHT: 215.8 LBS | RESPIRATION RATE: 18 BRPM | TEMPERATURE: 97 F | OXYGEN SATURATION: 96 % | DIASTOLIC BLOOD PRESSURE: 74 MMHG | SYSTOLIC BLOOD PRESSURE: 110 MMHG | BODY MASS INDEX: 40.75 KG/M2 | HEIGHT: 61 IN

## 2023-05-24 LAB
ALBUMIN SERPL BCP-MCNC: 3.9 G/DL (ref 3.5–5)
ALP SERPL-CCNC: 89 U/L (ref 34–104)
ALT SERPL W P-5'-P-CCNC: 9 U/L (ref 7–52)
ANION GAP SERPL CALCULATED.3IONS-SCNC: 14 MMOL/L (ref 4–13)
AST SERPL W P-5'-P-CCNC: 12 U/L (ref 13–39)
BASOPHILS # BLD AUTO: 0.03 THOUSANDS/ÂΜL (ref 0–0.1)
BASOPHILS NFR BLD AUTO: 0 % (ref 0–1)
BILIRUB SERPL-MCNC: 0.41 MG/DL (ref 0.2–1)
BUN SERPL-MCNC: 78 MG/DL (ref 5–25)
CALCIUM SERPL-MCNC: 7.2 MG/DL (ref 8.4–10.2)
CHLORIDE SERPL-SCNC: 96 MMOL/L (ref 96–108)
CO2 SERPL-SCNC: 25 MMOL/L (ref 21–32)
CREAT SERPL-MCNC: 1.91 MG/DL (ref 0.6–1.3)
EOSINOPHIL # BLD AUTO: 0.71 THOUSAND/ÂΜL (ref 0–0.61)
EOSINOPHIL NFR BLD AUTO: 10 % (ref 0–6)
ERYTHROCYTE [DISTWIDTH] IN BLOOD BY AUTOMATED COUNT: 13.7 % (ref 11.6–15.1)
GFR SERPL CREATININE-BSD FRML MDRD: 26 ML/MIN/1.73SQ M
GLUCOSE SERPL-MCNC: 192 MG/DL (ref 65–140)
GLUCOSE SERPL-MCNC: 204 MG/DL (ref 65–140)
GLUCOSE SERPL-MCNC: 284 MG/DL (ref 65–140)
HCT VFR BLD AUTO: 37.5 % (ref 34.8–46.1)
HGB BLD-MCNC: 12 G/DL (ref 11.5–15.4)
IMM GRANULOCYTES # BLD AUTO: 0.03 THOUSAND/UL (ref 0–0.2)
IMM GRANULOCYTES NFR BLD AUTO: 0 % (ref 0–2)
LYMPHOCYTES # BLD AUTO: 0.92 THOUSANDS/ÂΜL (ref 0.6–4.47)
LYMPHOCYTES NFR BLD AUTO: 12 % (ref 14–44)
MCH RBC QN AUTO: 28.3 PG (ref 26.8–34.3)
MCHC RBC AUTO-ENTMCNC: 32 G/DL (ref 31.4–37.4)
MCV RBC AUTO: 88 FL (ref 82–98)
MONOCYTES # BLD AUTO: 0.53 THOUSAND/ÂΜL (ref 0.17–1.22)
MONOCYTES NFR BLD AUTO: 7 % (ref 4–12)
NEUTROPHILS # BLD AUTO: 5.24 THOUSANDS/ÂΜL (ref 1.85–7.62)
NEUTS SEG NFR BLD AUTO: 71 % (ref 43–75)
NRBC BLD AUTO-RTO: 0 /100 WBCS
PLATELET # BLD AUTO: 312 THOUSANDS/UL (ref 149–390)
PMV BLD AUTO: 10.9 FL (ref 8.9–12.7)
POTASSIUM SERPL-SCNC: 4.1 MMOL/L (ref 3.5–5.3)
PROT SERPL-MCNC: 7 G/DL (ref 6.4–8.4)
RBC # BLD AUTO: 4.24 MILLION/UL (ref 3.81–5.12)
SODIUM SERPL-SCNC: 135 MMOL/L (ref 135–147)
WBC # BLD AUTO: 7.46 THOUSAND/UL (ref 4.31–10.16)

## 2023-05-24 RX ORDER — CALCIUM CARBONATE 500 MG/1
750 TABLET, CHEWABLE ORAL
Qty: 32 TABLET | Refills: 0 | Status: SHIPPED | OUTPATIENT
Start: 2023-05-24 | End: 2023-05-31

## 2023-05-24 RX ADMIN — DULOXETINE HYDROCHLORIDE 60 MG: 60 CAPSULE, DELAYED RELEASE ORAL at 08:18

## 2023-05-24 RX ADMIN — CALCIUM CARBONATE (ANTACID) CHEW TAB 500 MG 750 MG: 500 CHEW TAB at 06:36

## 2023-05-24 RX ADMIN — ATORVASTATIN CALCIUM 80 MG: 40 TABLET, FILM COATED ORAL at 08:18

## 2023-05-24 RX ADMIN — CALCIUM CARBONATE (ANTACID) CHEW TAB 500 MG 750 MG: 500 CHEW TAB at 12:37

## 2023-05-24 RX ADMIN — INSULIN LISPRO 6 UNITS: 100 INJECTION, SOLUTION INTRAVENOUS; SUBCUTANEOUS at 12:37

## 2023-05-24 RX ADMIN — PREGABALIN 100 MG: 100 CAPSULE ORAL at 08:18

## 2023-05-24 RX ADMIN — ENOXAPARIN SODIUM 30 MG: 30 INJECTION SUBCUTANEOUS at 08:19

## 2023-05-24 RX ADMIN — CLOPIDOGREL BISULFATE 75 MG: 75 TABLET ORAL at 08:18

## 2023-05-24 RX ADMIN — ALLOPURINOL 100 MG: 100 TABLET ORAL at 08:18

## 2023-05-24 RX ADMIN — CARVEDILOL 3.12 MG: 3.12 TABLET, FILM COATED ORAL at 06:36

## 2023-05-24 RX ADMIN — SACUBITRIL AND VALSARTAN 1 TABLET: 24; 26 TABLET, FILM COATED ORAL at 08:20

## 2023-05-24 RX ADMIN — INSULIN LISPRO 2 UNITS: 100 INJECTION, SOLUTION INTRAVENOUS; SUBCUTANEOUS at 08:19

## 2023-05-24 RX ADMIN — ESCITALOPRAM OXALATE 5 MG: 5 TABLET, FILM COATED ORAL at 08:18

## 2023-05-24 NOTE — PROGRESS NOTES
Progress Note - Nephrology   Shellie Zimmer 79 y o  female MRN: [id number]  Unit/Bed#: -01 Encounter: [id number]    A/P:  1  Increase in creatinine   Likely related to diuretic use, will continue to monitor for now, patient to be discharged later today and will remain off of diuretics through May 26  After this would likely start her on her furosemide dosing 2 or 3 times a week  Please refer below  In the meantime continue to avoid potential nephrotoxins  Will check a basic metabolic panel Friday morning, May 26   2   Chronic kidney disease stage IIIb with baseline creatinine between 1 3-1 4 mg/dL  3  Secondary hyperparathyroidism, renal   Patient off of Sensipar, most recent PTH noted to be less than 50, would continue off of Sensipar, follow-up with outpatient nephrologist regarding change or modification in medication  4  Acute on chronic combined systolic and diastolic congestive heart failure   Patient is at high risk for congestive heart failure  She was asked to check her weights daily, which she does not currently do at home  She will take her furosemide at least 2 or 3 times a week depending on her weights  Patient is aware that the change in weight is due to increase fluids  She is also aware that if she happens to eat out at a restaurant, or food that is out of the ordinary that is high in sodium, she will most likely need to take a furosemide either the same day or the following day in order to manage the excess volume that sodium will cause      Follow up reason for today's visit: Chronic kidney disease/secondary parathyroidism    Acute on chronic combined systolic and diastolic congestive heart failure [facility])    Patient Active Problem List   Diagnosis   • Anemia in chronic kidney disease   • Acute on chronic combined systolic and diastolic congestive heart failure (HCC)   • Type 2 diabetes mellitus without complication, with long-term current use of insulin ([facility] [address] )   • Essential "hypertension   • Morbid obesity with BMI of 40 0-44 9, adult (HCC)   • Hypocalcemia   • Stage 3a chronic kidney disease (HCC)         Subjective:   Patient feels well and has no acute complaints at this time, breathing is comfortable and she feels to be at baseline  Objective:     Vitals: Blood pressure 110/74, pulse 80, temperature (!) 97 °F (36 1 °C), resp  rate 18, height 5' 1\" (1 549 m), weight 97 9 kg (215 lb 12 8 oz), SpO2 96 %  ,Body mass index is 40 78 kg/m²  Weight (last 2 days)     Date/Time Weight    05/24/23 0600 97 9 (215 8)    05/23/23 0530 97 3 (214 4)    05/23/23 0430 97 3 (214 4)    05/22/23 0803 98 (216)    05/22/23 0544 98 2 (216 4)    05/22/23 0452 98 2 (216 4)            Intake/Output Summary (Last 24 hours) at 5/24/2023 0953  Last data filed at 5/23/2023 1545  Gross per 24 hour   Intake 300 ml   Output --   Net 300 ml     I/O last 3 completed shifts:   In: 5 [P O :540]  Out: 600 [Urine:600]         Physical Exam: /74   Pulse 80   Temp (!) 97 °F (36 1 °C)   Resp 18   Ht 5' 1\" (1 549 m)   Wt 97 9 kg (215 lb 12 8 oz)   SpO2 96%   BMI 40 78 kg/m²     General Appearance:    Alert, cooperative, no distress, appears stated age   Head:    Normocephalic, without obvious abnormality, atraumatic   Eyes:    Conjunctiva/corneas clear   Ears:    Normal external ears   Nose:   Nares normal, septum midline, mucosa normal, no drainage    or sinus tenderness   Throat:   Lips, mucosa, and tongue normal; teeth and gums normal   Neck:   Supple, symmetrical, trachea midline, no adenopathy;        thyroid:  No enlargement/tenderness/nodules; no carotid    bruit or JVD   Back:     Symmetric, no curvature, ROM normal, no CVA tenderness   Lungs:     Clear to auscultation bilaterally, respirations unlabored   Chest wall:    No tenderness or deformity   Heart:    Regular rate and rhythm, S1 and S2 normal, no murmur, rub   or gallop   Abdomen:     Soft, non-tender, bowel sounds active   Extremities:   " "Extremities normal, atraumatic, no cyanosis or edema   Skin:   Skin color, texture, turgor normal, no rashes or lesions   Lymph nodes:   Cervical normal   Neurologic:   CNII-XII intact            Lab, Imaging and other studies: I have personally reviewed pertinent labs  CBC:   Lab Results   Component Value Date    HCT 37 5 05/24/2023    HGB 12 0 05/24/2023    MCH 28 3 05/24/2023    MCHC 32 0 05/24/2023    MCV 88 05/24/2023    MPV 10 9 05/24/2023    NRBC 0 05/24/2023     05/24/2023    RBC 4 24 05/24/2023    RDW 13 7 05/24/2023    WBC 7 46 05/24/2023     CMP:   Lab Results   Component Value Date    ALKPHOS 89 05/24/2023    ALT 9 05/24/2023    AST 12 (L) 05/24/2023    BUN 78 (H) 05/24/2023    CALCIUM 7 2 (L) 05/24/2023    CL 96 05/24/2023    CO2 25 05/24/2023    CREATININE 1 91 (H) 05/24/2023    EGFR 26 05/24/2023    K 4 1 05/24/2023           Results from last 7 days   Lab Units 05/24/23  0458 05/23/23  0430 05/22/23  0445 05/21/23  0755   ALK PHOS U/L 89  --   --  101   ALT U/L 9  --   --  12   AST U/L 12*  --   --  12*   BUN mg/dL 78* 57* 47* 43*   CALCIUM mg/dL 7 2* 7 2* 7 3* 6 9*   CHLORIDE mmol/L 96 97 99 102   CO2 mmol/L 25 23 25 24   CREATININE mg/dL 1 91* 1 68* 1 34* 1 34*   POTASSIUM mmol/L 4 1 4 1 3 6 4 5         Phosphorus: No results found for: \"PHOS\"  Magnesium: No results found for: \"MG\"  Urinalysis: No results found for: \"BILIRUBINUR\", \"BLOODU\", \"CLARITYU\", \"COLORU\", \"GLUCOSEU\", \"KETONESU\", \"LEUKOCYTESUR\", \"NITRITE\", \"PHUR\", \"PROTEINUA\", \"SPECGRAV\"  Ionized Calcium: No results found for: \"CAION\"  Coagulation: No results found for: \"APTT\", \"INR\", \"PT\"  Troponin: No results found for: \"TROPONINI\"  ABG: No results found for: \"BEART\", \"OUI9ZKE\", \"J1HRQWOP\", \"ZWV0OQE\", \"PHART\", \"PO2ART\", \"SOURCE\"  Radiology review:     IMAGING  Procedure: Echo complete w/ contrast if indicated    Result Date: 5/22/2023  Narrative: •  Left Ventricle: Left ventricular cavity size is upper normal  Wall thickness is " normal  The left ventricular ejection fraction is 30%  Systolic function is severely reduced  There is severe global hypokinesis with specific regional wall abnormalities (apical akinesis)  Diastolic function is mildly abnormal, consistent with grade I (abnormal) relaxation  Left atrial filling pressure is elevated  •  Right Ventricle: Right ventricular cavity size is normal  Systolic function is normal  A pacer wire is present  •  Right Atrium: The atrium is normal in size  A pacer wire is present  •  Mitral Valve: There is mild regurgitation  There is no evidence of stenosis  •  Tricuspid Valve: There is trace regurgitation  There is no evidence of stenosis  •  Pulmonic Valve: There is trace regurgitation  There is no evidence of stenosis  •  No prior study available for comparison         Current Facility-Administered Medications   Medication Dose Route Frequency   • acetaminophen (TYLENOL) tablet 650 mg  650 mg Oral Q6H PRN   • allopurinol (ZYLOPRIM) tablet 100 mg  100 mg Oral Daily   • atorvastatin (LIPITOR) tablet 80 mg  80 mg Oral Daily   • calcium carbonate (TUMS) chewable tablet 750 mg  750 mg Oral TID AC   • carvedilol (COREG) tablet 3 125 mg  3 125 mg Oral BID With Meals   • clopidogrel (PLAVIX) tablet 75 mg  75 mg Oral Daily   • DULoxetine (CYMBALTA) delayed release capsule 60 mg  60 mg Oral Daily   • enoxaparin (LOVENOX) subcutaneous injection 30 mg  30 mg Subcutaneous Q24H JAX   • escitalopram (LEXAPRO) tablet 5 mg  5 mg Oral Daily   • insulin glargine (LANTUS) subcutaneous injection 14 Units 0 14 mL  14 Units Subcutaneous HS   • insulin lispro (HumaLOG) 100 units/mL subcutaneous injection 2-12 Units  2-12 Units Subcutaneous TID AC   • pregabalin (LYRICA) capsule 100 mg  100 mg Oral Daily   • sacubitril-valsartan (ENTRESTO) 24-26 MG per tablet 1 tablet  1 tablet Oral BID     Medications Discontinued During This Encounter   Medication Reason   • albuterol (PROAIR HFA) 90 mcg/act inhaler    • benzonatate (TESSALON) 200 MG capsule    • fenofibrate (TRIGLIDE) 160 MG tablet    • sitaGLIPtin (JANUVIA) 100 mg tablet    • furosemide (LASIX) injection 40 mg    • amitriptyline (ELAVIL) 25 mg tablet Error   • losartan (COZAAR) 100 MG tablet Error   • ezetimibe (ZETIA) 10 mg tablet Error   • ezetimibe (ZETIA) tablet 10 mg    • DULoxetine (CYMBALTA) delayed release capsule 20 mg    • furosemide (LASIX) injection 60 mg    • insulin glargine (LANTUS) subcutaneous injection 10 Units 0 1 mL    • midodrine (PROAMATINE) tablet 5 mg        Hamden Gum, DO      This progress note was produced in part using a dictation device which may document imprecise wording from author's original intent

## 2023-05-24 NOTE — ASSESSMENT & PLAN NOTE
Wt Readings from Last 3 Encounters:   05/24/23 97 9 kg (215 lb 12 8 oz)   01/02/19 95 3 kg (210 lb)    known history of congestive heart failure however I could not find any documentation in her cardiologist or any previous 2D echoes for EF documentation  History of CAD s/p stent and also AICD-as per patient, she used to follow-up with cardiology at [facility]  2D echo done 5/22 prelim ef of 31%  Recently diuretics were discontinued due to worsening creatinine  Presents with worsening shortness of breath and hypocalcemia  Patient was on IV Lasix, which remain on hold due to worsening renal function but patient clinically improved  Repeat chest x-ray shows improvement  Discussed with nephrology, recommends to repeat BMP on Friday, and patient can take Lasix 2-3 times per week  Also advised the patient to check her weight at home, if patient gains 2 to 3 pounds over 24 hours or 3 to 5 pounds over 1 week, can take extra dose  Advised the patient to follow-up with cardiology and PCP on outpatient basis  Hold Entresto daily repeat BMP is done -As per nephrology recommendation, patient can be discharged, with follow-up with outpatient and repeat BMP on Friday

## 2023-05-24 NOTE — ASSESSMENT & PLAN NOTE
"No results found for: \"HGBA1C\"    Recent Labs     05/23/23  1058 05/23/23  1539 05/23/23  2111 05/24/23  0738   POCGLU 321* 192* 196* 192*       Blood Sugar Average: Last 72 hrs:  (P) 215 75   Can resume home medication    "

## 2023-05-24 NOTE — PLAN OF CARE
Problem: Potential for Falls  Goal: Patient will remain free of falls  Description: INTERVENTIONS:  - Educate patient/family on patient safety including physical limitations  - Instruct patient to call for assistance with activity   - Consult OT/PT to assist with strengthening/mobility   - Keep Call bell within reach  - Keep bed low and locked with side rails adjusted as appropriate  - Keep care items and personal belongings within reach  - Initiate and maintain comfort rounds  - Make Fall Risk Sign visible to staff  Problem: CARDIOVASCULAR - ADULT  Goal: Maintains optimal cardiac output and hemodynamic stability  Description: INTERVENTIONS:  - Monitor I/O, vital signs and rhythm  - Monitor for S/S and trends of decreased cardiac output SOB BUSTOS swelling   - Administer and titrate ordered vasoactive medications to optimize hemodynamic stability  - Assess quality of pulses, skin color and temperature  - Assess for signs of decreased coronary artery perfusion  - Instruct patient to report change in severity of symptoms  Outcome: Progressing  Goal: Absence of cardiac dysrhythmias or at baseline rhythm  Description: INTERVENTIONS:  - Continuous cardiac monitoring, vital signs, obtain 12 lead EKG if ordered  - Administer antiarrhythmic and heart rate control medications as ordered  - Monitor electrolytes and administer replacement therapy as ordered  Outcome: Progressing     - Apply yellow socks and bracelet for high fall risk patients  - Consider moving patient to room near nurses station  Outcome: Progressing

## 2023-05-24 NOTE — NURSING NOTE
SpO2 level at 80% ORA  Patient exhibiting no s/s distress   Placed on O2 @ 2L via n/c  SpO2 returned to 94%,

## 2023-05-24 NOTE — ASSESSMENT & PLAN NOTE
mayBe medication induced as patient is on Sensipar at home  Sensipar remain on hold, advised the patient to follow-up with nephrology for further recommendation

## 2023-05-24 NOTE — DISCHARGE SUMMARY
[address]  Discharge- Stella Lerner 1955, 79 y o  female MRN: [id number]  Unit/Bed#: -Yuan Encounter: [id number]  Primary Care Provider: Aby Matthew MD   Date and time admitted to hospital: 5/21/2023  7:43 AM    * Acute on chronic combined systolic and diastolic congestive heart failure ([address] )  Assessment & Plan  Wt Readings from Last 3 Encounters:   05/24/23 97 9 kg (215 lb 12 8 oz)   01/02/19 95 3 kg (210 lb)    known history of congestive heart failure however I could not find any documentation in her cardiologist or any previous 2D echoes for EF documentation  History of CAD s/p stent and also AICD-as per patient, she used to follow-up with cardiology at [facility]  2D echo done 5/22 prelim ef of 31%  Recently diuretics were discontinued due to worsening creatinine  Presents with worsening shortness of breath and hypocalcemia  Patient was on IV Lasix, which remain on hold due to worsening renal function but patient clinically improved  Repeat chest x-ray shows improvement  Discussed with nephrology, recommends to repeat BMP on Friday, and patient can take Lasix 2-3 times per week  Also advised the patient to check her weight at home, if patient gains 2 to 3 pounds over 24 hours or 3 to 5 pounds over 1 week, can take extra dose  Advised the patient to follow-up with cardiology and PCP on outpatient basis  Hold Entresto daily repeat BMP is done -As per nephrology recommendation, patient can be discharged, with follow-up with outpatient and repeat BMP on Friday  Stage 3a chronic kidney disease [facility])  Assessment & Plan  Lab Results   Component Value Date    CREATININE 1 91 (H) 05/24/2023    CREATININE 1 68 (H) 05/23/2023    CREATININE 1 34 (H) 05/22/2023    EGFR 26 05/24/2023    EGFR 31 05/23/2023    EGFR 41 05/22/2023     Baseline Creatinine is 1 3-1 4      Creatinine minimally elevated due to diuretic effect, as per nephrology, patient "can be discharged follow-up with outpatient nephrologist   Repeat BMP in 2 to 3 days  Hypocalcemia  Assessment & Plan  mayBe medication induced as patient is on Sensipar at home  Sensipar remain on hold, advised the patient to follow-up with nephrology for further recommendation  Morbid obesity with BMI of 40 0-44 9, adult ([facility] [address] )  Assessment & Plan  BMI is 40 61  counselled on diet exercise and lifestyle modification    Essential hypertension  Assessment & Plan  bp currently slightly elevated secondary to acute CHF exacerbation  Restart home meds     Type 2 diabetes mellitus without complication, with long-term current use of insulin [facility])  Assessment & Plan  No results found for: \"HGBA1C\"    Recent Labs     05/23/23  1058 05/23/23  1539 05/23/23  2111 05/24/23  0738   POCGLU 321* 192* 196* 192*       Blood Sugar Average: Last 72 hrs:  (P) 215 75   Can resume home medication  Anemia in chronic kidney disease  Assessment & Plan  Lab Results   Component Value Date    CREATININE 1 91 (H) 05/24/2023    CREATININE 1 68 (H) 05/23/2023    CREATININE 1 34 (H) 05/22/2023    EGFR 26 05/24/2023    EGFR 31 05/23/2023    EGFR 41 05/22/2023   Creatinine trending up, patient received 1 dose of Lasix yesterday,  Repeat chest x-ray shows improvement, but renal function is trending up  As per nephrology, patient can be discharged with a repeat BMP on Friday and follow-up with outpatient basis  Keep holding Entresto team repeat BMP        Medical Problems     Resolved Problems  Date Reviewed: 5/22/2023   None       Discharging Physician / Practitioner: Thuy Barone MD  PCP: Julian Wogn MD  Admission Date:   Admission Orders (From admission, onward)     Ordered        05/21/23 6375  INPATIENT ADMISSION  Once                      Discharge Date: 05/24/23    Consultations During Hospital Stay:  · Nephrology    Procedures Performed:   XR chest portable   Final Result by Philly Allison MD (05/24 1100)    " Improved interstitial edema  Workstation performed: [id number]         XR chest 1 view portable   ED Interpretation by Jennifer Yoder MD (05/21 3821)   Pulmonary vascular congestion      Final Result by Ashanti Guzman MD (05/21 0603)      Cardiomegaly with mild central vascular congestion  Workstation performed: [id number]         ·   ECHO:  Left Ventricle: Left ventricular cavity size is upper normal  Wall thickness is normal  The left ventricular ejection fraction is 30%  Systolic function is severely reduced  There is severe global hypokinesis with specific regional wall abnormalities (apical akinesis)  Diastolic function is mildly abnormal, consistent with grade I (abnormal) relaxation  Left atrial filling pressure is elevated  •  Right Ventricle: Right ventricular cavity size is normal  Systolic function is normal  A pacer wire is present  •  Right Atrium: The atrium is normal in size  A pacer wire is present  •  Mitral Valve: There is mild regurgitation  There is no evidence of stenosis  •  Tricuspid Valve: There is trace regurgitation  There is no evidence of stenosis  •  Pulmonic Valve: There is trace regurgitation  There is no evidence of stenosis  •  No prior study available for comparison    ·     Significant Findings / Test Results:   Lab Results   Component Value Date    HCT 37 5 05/24/2023    HGB 12 0 05/24/2023    MCV 88 05/24/2023     05/24/2023    WBC 7 46 05/24/2023   ·   Lab Results   Component Value Date    AGAP 14 (H) 05/24/2023    ALKPHOS 89 05/24/2023    ALT 9 05/24/2023    AST 12 (L) 05/24/2023    BUN 78 (H) 05/24/2023    CALCIUM 7 2 (L) 05/24/2023    CL 96 05/24/2023    CO2 25 05/24/2023    CREATININE 1 91 (H) 05/24/2023    EGFR 26 05/24/2023    GLUC 204 (H) 05/24/2023    K 4 1 05/24/2023    SODIUM 135 05/24/2023    TBILI 0 41 05/24/2023    TP 7 0 05/24/2023   ·   ·     Incidental Findings:   · As mentioned above  · I reviewed the "above mentioned incidental findings with the patient and/or family and they expressed understanding  Test Results Pending at Discharge (will require follow up): · None     Outpatient Tests Requested:  · Patient will need to repeat BMP in 2 to 3 days, prescription provided and requested the result to be sent to patient's PCP and nephrology office    Complications: None    Reason for Admission: Shortness of breath    Hospital Course:   Stella Lerner is a 79 y o  female patient who originally presented to the hospital on 5/21/2023 due to shortness of breath  Patient admitted under the diagnosis of acute on chronic combined systolic and diastolic congestive heart failure  Patient received IV diuretics, with treatment, patient condition improved, patient off of oxygen  Repeat imaging shows improvement  But patient renal function was trending up  [address] nephrology, recommends to be discharged and repeat BMP in 2 to 3 days and follow-up with patient's on nephrologist outpatient basis  As per nephrology, patient can take Lasix 2-3 times per week, as well as maintain and monitor weight  If patient is still gaining weight 2 to 3 pounds in 24 hours or 3 to 5 pounds over a week, can take extra dose of Lasix  Entresto remains on hold till repeat BMP  Patient also advised to hold Sensipar till evaluated by nephrologist   Lab results, imaging findings, treatment plan and option discussed in details with patient  Verbalized understanding agrees  Patient  at the bedside  Please see above list of diagnoses and related plan for additional information  Condition at Discharge: stable    Discharge Day Visit / Exam:   Subjective: Seen and evaluated during the event  Resting comfortably  Denies any significant complaint    Vitals: Blood Pressure: 110/74 (05/24/23 0740)  Pulse: 80 (05/24/23 0740)  Temperature: (!) 97 °F (36 1 °C) (05/24/23 0740)  Respirations: 18 (05/24/23 0740)  Height: 5' 1\" (154 9 cm) " (05/22/23 0803)  Weight - Scale: 97 9 kg (215 lb 12 8 oz) (05/24/23 0600)  SpO2: 96 % (05/24/23 0740)  Exam:   Physical Exam  Vitals and nursing note reviewed  Constitutional:       Appearance: Normal appearance  She is obese  She is not ill-appearing or diaphoretic  Eyes:      General: No scleral icterus  Left eye: No discharge  Extraocular Movements: Extraocular movements intact  Conjunctiva/sclera: Conjunctivae normal       Pupils: Pupils are equal, round, and reactive to light  Cardiovascular:      Rate and Rhythm: Normal rate  Heart sounds: No murmur heard  No friction rub  No gallop  Pulmonary:      Effort: Pulmonary effort is normal  No respiratory distress  Breath sounds: No stridor  No wheezing or rhonchi  Abdominal:      General: Abdomen is flat  Bowel sounds are normal  There is no distension  Palpations: There is no mass  Tenderness: There is no abdominal tenderness  Hernia: No hernia is present  Musculoskeletal:      Cervical back: Normal range of motion  Right lower leg: No edema  Left lower leg: No edema  Skin:     General: Skin is warm  Capillary Refill: Capillary refill takes less than 2 seconds  Coloration: Skin is not jaundiced or pale  Findings: No bruising or erythema  Neurological:      General: No focal deficit present  Mental Status: She is oriented to person, place, and time  Cranial Nerves: No cranial nerve deficit  Sensory: No sensory deficit  Motor: No weakness  Coordination: Coordination normal          Discussion with Family: Updated  () at bedside  Discharge instructions/Information to patient and family:   See after visit summary for information provided to patient and family  Provisions for Follow-Up Care:  See after visit summary for information related to follow-up care and any pertinent home health orders         Disposition:   Home    Planned Readmission: If condition get worse     Discharge Statement:   Greater than 50% of the total time was spent examining patient, answering all patient questions, arranging and discussing plan of care with patient as well as directly providing post-discharge instructions  Additional time then spent on discharge activities  Discharge Medications:  See after visit summary for reconciled discharge medications provided to patient and/or family        **Please Note: This note may have been constructed using a voice recognition system**

## 2023-05-24 NOTE — PLAN OF CARE
Problem: CARDIOVASCULAR - ADULT  Goal: Maintains optimal cardiac output and hemodynamic stability  Description: INTERVENTIONS:  - Monitor I/O, vital signs and rhythm  - Monitor for S/S and trends of decreased cardiac output SOB BUSTOS swelling   - Administer and titrate ordered vasoactive medications to optimize hemodynamic stability  - Assess quality of pulses, skin color and temperature  - Assess for signs of decreased coronary artery perfusion  - Instruct patient to report change in severity of symptoms  Outcome: Progressing  Goal: Absence of cardiac dysrhythmias or at baseline rhythm  Description: INTERVENTIONS:  - Continuous cardiac monitoring, vital signs, obtain 12 lead EKG if ordered  - Administer antiarrhythmic and heart rate control medications as ordered  - Monitor electrolytes and administer replacement therapy as ordered  Outcome: Progressing

## 2023-05-24 NOTE — ASSESSMENT & PLAN NOTE
Lab Results   Component Value Date    CREATININE 1 91 (H) 05/24/2023    CREATININE 1 68 (H) 05/23/2023    CREATININE 1 34 (H) 05/22/2023    EGFR 26 05/24/2023    EGFR 31 05/23/2023    EGFR 41 05/22/2023   Creatinine trending up, patient received 1 dose of Lasix yesterday,  Repeat chest x-ray shows improvement, but renal function is trending up  As per nephrology, patient can be discharged with a repeat BMP on Friday and follow-up with outpatient basis  Keep holding Entresto team repeat BMP

## 2023-05-24 NOTE — ASSESSMENT & PLAN NOTE
Lab Results   Component Value Date    CREATININE 1 91 (H) 05/24/2023    CREATININE 1 68 (H) 05/23/2023    CREATININE 1 34 (H) 05/22/2023    EGFR 26 05/24/2023    EGFR 31 05/23/2023    EGFR 41 05/22/2023     Baseline Creatinine is 1 3-1 4  Creatinine minimally elevated due to diuretic effect, as per nephrology, patient can be discharged follow-up with outpatient nephrologist   Repeat BMP in 2 to 3 days

## 2023-05-25 LAB
ATRIAL RATE: 101 BPM
P AXIS: 46 DEGREES
PR INTERVAL: 148 MS
QRS AXIS: -38 DEGREES
QRSD INTERVAL: 98 MS
QT INTERVAL: 392 MS
QTC INTERVAL: 508 MS
T WAVE AXIS: 84 DEGREES
VENTRICULAR RATE: 101 BPM

## 2023-05-25 NOTE — UTILIZATION REVIEW
NOTIFICATION OF ADMISSION DISCHARGE   This is a Notification of Discharge from [address]  Please be advised that this patient has been discharge from our facility  Below you will find the admission and discharge date and time including the patient’s disposition  UTILIZATION REVIEW CONTACT:  [address] [place]  Utilization   Network Utilization Review Department  Phone: [phone] x carefully listen to the prompts  All voicemails are confidential   Email: [email or web address]     ADMISSION INFORMATION  PRESENTATION DATE: 5/21/2023  7:43 AM  OBERVATION ADMISSION DATE:   INPATIENT ADMISSION DATE: 5/21/23  9:52 AM   DISCHARGE DATE: 5/24/2023  1:41 PM   DISPOSITION:Home/Self Care    IMPORTANT INFORMATION:  Send all requests for admission clinical reviews, approved or denied determinations and any other requests to dedicated fax number below belonging to the campus where the patient is receiving treatment   List of dedicated fax numbers:  [address] DENIALS (Administrative/Medical Necessity) [phone]   [address] (Maternity/NICU/Pediatrics) [phone]   [facility] [phone]   [facility] 87 [phone]   [address] [phone]   220 [facility] [phone] 90 [facility] [phone]   [address] [place] [address] 119 [phone]   [facility]  [phone]   [address] [facility] [phone] 412 [facility] [address] [facility] [phone]

## 2023-12-10 ENCOUNTER — OFFICE VISIT (OUTPATIENT)
Dept: URGENT CARE | Facility: CLINIC | Age: 68
End: 2023-12-10
Payer: COMMERCIAL

## 2023-12-10 ENCOUNTER — APPOINTMENT (OUTPATIENT)
Dept: RADIOLOGY | Facility: CLINIC | Age: 68
End: 2023-12-10
Payer: COMMERCIAL

## 2023-12-10 VITALS
WEIGHT: 215 LBS | HEIGHT: 61 IN | TEMPERATURE: 97 F | RESPIRATION RATE: 20 BRPM | SYSTOLIC BLOOD PRESSURE: 126 MMHG | HEART RATE: 110 BPM | OXYGEN SATURATION: 95 % | DIASTOLIC BLOOD PRESSURE: 77 MMHG | BODY MASS INDEX: 40.59 KG/M2

## 2023-12-10 DIAGNOSIS — J32.9 SINOBRONCHITIS: Primary | ICD-10-CM

## 2023-12-10 DIAGNOSIS — J40 SINOBRONCHITIS: Primary | ICD-10-CM

## 2023-12-10 DIAGNOSIS — J32.9 SINOBRONCHITIS: ICD-10-CM

## 2023-12-10 DIAGNOSIS — J40 SINOBRONCHITIS: ICD-10-CM

## 2023-12-10 PROCEDURE — 71046 X-RAY EXAM CHEST 2 VIEWS: CPT

## 2023-12-10 PROCEDURE — 99213 OFFICE O/P EST LOW 20 MIN: CPT | Performed by: PHYSICIAN ASSISTANT

## 2023-12-10 RX ORDER — AZITHROMYCIN 250 MG/1
TABLET, FILM COATED ORAL
Qty: 6 TABLET | Refills: 0 | Status: SHIPPED | OUTPATIENT
Start: 2023-12-10 | End: 2023-12-14

## 2023-12-10 NOTE — PROGRESS NOTES
[facility]        NAME: Lena Cullen is a 76 y.o. female  : 1955    MRN: [id number]  DATE: December 10, 2023  TIME: 11:54 AM    Assessment and Plan   Sinobronchitis [J32.9, J40]  1. Sinobronchitis  XR chest pa & lateral    azithromycin (ZITHROMAX) 250 mg tablet            Patient Instructions   Drink plenty of fluids. May use over the counter cold medications for symptomatic treatment. Do not use medications with Pseudoephedrine or Phenylphrine if you have high blood pressure because it may worsen your blood pressure. Follow up with your PCP in 3-5 days if your symptoms do not improve or if you have any concerns. Go to the ER if symptoms become severe. Follow up with PCP in 3-5 days. Proceed to  ER if symptoms worsen. Chief Complaint     Chief Complaint   Patient presents with    Cough     With chest congestion starting     Cold Like Symptoms     Cough and chest congestion 1.5 weeks          History of Present Illness       Patient is a 66-year-old female with significant past medical history of pretension, hyperlipidemia, CHF, CKD stage III for 1.5 weeks. Denies fevers or chills. Seen a few days ago and had negative COVID-19 test.  She was given prednisone which helped a little but states symptoms are otherwise not improving. Patient is biggest complaint is productive cough. Review of Systems   Review of Systems   Constitutional:  Negative for chills and fever. HENT:  Positive for congestion, postnasal drip and rhinorrhea. Negative for sore throat. Respiratory:  Positive for cough and wheezing. Negative for shortness of breath. Cardiovascular:  Negative for chest pain and palpitations. Gastrointestinal:  Negative for abdominal pain, diarrhea, nausea and vomiting. Musculoskeletal:  Negative for myalgias. Skin:  Negative for rash. Neurological:  Negative for dizziness, light-headedness and headaches.          Current Medications       Current Outpatient Medications:     allopurinol (ZYLOPRIM) 100 mg tablet, Take 100 mg by mouth daily, Disp: , Rfl:     atorvastatin (LIPITOR) 80 mg tablet, Take 80 mg by mouth daily, Disp: , Rfl:     azithromycin (ZITHROMAX) 250 mg tablet, Take 2 tablets today then 1 tablet daily x 4 days, Disp: 6 tablet, Rfl: 0    carvedilol (COREG) 3.125 mg tablet, Take 3.125 mg by mouth 2 (two) times a day with meals, Disp: , Rfl:     clopidogrel (PLAVIX) 75 mg tablet, Take 75 mg by mouth daily, Disp: , Rfl:     DULoxetine HCl 20 MG CSDR, Take 20 mg by mouth daily, Disp: , Rfl:     escitalopram (LEXAPRO) 10 mg tablet, Take 5 mg by mouth daily, Disp: , Rfl:     Insulin Glargine Solostar 100 UNIT/ML SOPN, INJECT 14 UNITS SUBCUTANEOUSLY NIGHTLY, Disp: , Rfl:     midodrine (PROAMATINE) 5 mg tablet, Take 5 mg by mouth 2 (two) times a day, Disp: , Rfl:     pregabalin (LYRICA) 100 mg capsule, Take 100 mg by mouth, Disp: , Rfl:     sacubitril-valsartan (Entresto) 24-26 MG TABS, 2 (two) times daily. , Disp: , Rfl:     Current Allergies     Allergies as of 12/10/2023 - Reviewed 12/10/2023   Allergen Reaction Noted    Metformin Other (See Comments) 04/26/2023    Nsaids Other (See Comments) 03/18/2019    Medical tape Rash 05/08/2023            The following portions of the patient's history were reviewed and updated as appropriate: allergies, current medications, past family history, past medical history, past social history, past surgical history and problem list.     Past Medical History:   Diagnosis Date    Anemia     Anxiety     CHF (congestive heart failure) ([address])     Chronic kidney disease, stage 3 ([address])     Depression     Diabetes mellitus ([address])     Hyperlipemia     Hypertension     Ischemic cardiomyopathy     MI (myocardial infarction) ([address])     Pacemaker     Peripheral polyneuropathy     Sinus tachycardia        Past Surgical History:   Procedure Laterality Date    CARDIAC PACEMAKER PLACEMENT      CORONARY ANGIOPLASTY WITH STENT PLACEMENT History reviewed. No pertinent family history. Medications have been verified. Objective   /77   Pulse (!) 110   Temp (!) 97 °F (36.1 °C)   Resp 20   Ht 5' 1" (1.549 m)   Wt 97.5 kg (215 lb)   SpO2 95%   BMI 40.62 kg/m²   No LMP recorded. Physical Exam     Physical Exam  Vitals and nursing note reviewed. Constitutional:       Appearance: Normal appearance. She is well-developed. HENT:      Head: Normocephalic and atraumatic. Right Ear: Tympanic membrane, ear canal and external ear normal.      Left Ear: Tympanic membrane, ear canal and external ear normal.      Nose: Congestion and rhinorrhea present. Mouth/Throat:      Pharynx: Uvula midline. Eyes:      General: Lids are normal.      Conjunctiva/sclera: Conjunctivae normal.      Pupils: Pupils are equal, round, and reactive to light. Cardiovascular:      Rate and Rhythm: Normal rate and regular rhythm. Pulses: Normal pulses. Heart sounds: Normal heart sounds. No murmur heard. No friction rub. No gallop. Pulmonary:      Effort: Pulmonary effort is normal.      Breath sounds: Examination of the left-lower field reveals rales. Rales present. No wheezing or rhonchi. Musculoskeletal:         General: Normal range of motion. Cervical back: Neck supple. Lymphadenopathy:      Cervical: No cervical adenopathy. Skin:     General: Skin is warm and dry. Capillary Refill: Capillary refill takes less than 2 seconds. Neurological:      Mental Status: She is alert. Chest XR: No evidence of acute cardiopulmonary etiology. Radiology interpretation pending.

## 2024-07-06 ENCOUNTER — OFFICE VISIT (OUTPATIENT)
Dept: URGENT CARE | Facility: CLINIC | Age: 69
End: 2024-07-06
Payer: COMMERCIAL

## 2024-07-06 VITALS
HEART RATE: 109 BPM | RESPIRATION RATE: 20 BRPM | SYSTOLIC BLOOD PRESSURE: 143 MMHG | OXYGEN SATURATION: 96 % | WEIGHT: 209.4 LBS | TEMPERATURE: 97.4 F | BODY MASS INDEX: 38.53 KG/M2 | HEIGHT: 62 IN | DIASTOLIC BLOOD PRESSURE: 80 MMHG

## 2024-07-06 DIAGNOSIS — B34.9 ACUTE VIRAL SYNDROME: ICD-10-CM

## 2024-07-06 DIAGNOSIS — H69.93 EUSTACHIAN TUBE DYSFUNCTION, BILATERAL: Primary | ICD-10-CM

## 2024-07-06 PROCEDURE — 99213 OFFICE O/P EST LOW 20 MIN: CPT | Performed by: PHYSICIAN ASSISTANT

## 2024-07-06 RX ORDER — TOLTERODINE TARTRATE 1 MG/1
1 TABLET, EXTENDED RELEASE ORAL 2 TIMES DAILY
COMMUNITY
Start: 2024-05-16

## 2024-07-06 RX ORDER — PREDNISONE 10 MG/1
TABLET ORAL
Qty: 18 TABLET | Refills: 0 | Status: SHIPPED | OUTPATIENT
Start: 2024-07-06

## 2024-07-06 RX ORDER — DAPAGLIFLOZIN 10 MG/1
10 TABLET, FILM COATED ORAL DAILY
COMMUNITY
Start: 2024-04-29

## 2024-07-06 RX ORDER — FUROSEMIDE 20 MG/1
20 TABLET ORAL DAILY PRN
COMMUNITY
Start: 2023-08-08 | End: 2024-08-07

## 2024-07-06 RX ORDER — CINACALCET 30 MG/1
TABLET, FILM COATED ORAL
COMMUNITY
Start: 2024-03-13

## 2024-07-06 RX ORDER — LANOLIN ALCOHOL/MO/W.PET/CERES
400 CREAM (GRAM) TOPICAL 3 TIMES DAILY
COMMUNITY
Start: 2024-05-02

## 2024-07-06 RX ORDER — FLUTICASONE PROPIONATE 50 MCG
2 SPRAY, SUSPENSION (ML) NASAL
COMMUNITY
Start: 2020-09-09 | End: 2027-03-29

## 2024-07-06 RX ORDER — SEMAGLUTIDE 0.68 MG/ML
INJECTION, SOLUTION SUBCUTANEOUS
COMMUNITY
Start: 2024-05-28

## 2024-07-06 NOTE — PROGRESS NOTES
[facility]        NAME: Rita Meadows is a 68 y.o. female  : 1955    MRN: [id number]  DATE: 2024  TIME: 10:29 AM    Assessment and Plan   Eustachian tube dysfunction, bilateral [H69.93]  1. Eustachian tube dysfunction, bilateral  predniSONE 10 mg tablet      2. Acute viral syndrome              Patient Instructions       Follow up with PCP i as needed.  Increase fluids.  Discussed viral syndrome and viral xanthoma.    If tests have been performed at [facility], our office will contact you with results if changes need to be made to the care plan discussed with you at the visit.  You can review your full results on [facility].    Chief Complaint     Chief Complaint   Patient presents with    Rash     Itchy rash on left arm and under chin started 2 days ago spread to left leg this am.    Fever     And chills started Wednesday night. Episode of dizziness yesterday.          History of Present Illness       Patient started 3 to 4 days ago with congestion and cough.  She had a low-grade fever which broke yesterday and today she started with erythematous rash.    Rash  This is a new problem. The current episode started today. The problem is unchanged. The rash is diffuse. The problem is mild. The rash is characterized by itchiness and redness. She was exposed to nothing. Associated symptoms include congestion, coughing, itching and rhinorrhea. Pertinent negatives include no anorexia, decreased physical activity, decreased responsiveness, decreased sleep, drinking less, diarrhea, facial edema, fatigue, fever, joint pain, shortness of breath, sore throat or vomiting. Past treatments include nothing. The treatment provided no relief.   Fever  Associated symptoms include congestion, coughing and a rash. Pertinent negatives include no anorexia, fatigue, fever, sore throat or vomiting.       Review of Systems   Review of Systems   Constitutional:  Negative for decreased responsiveness, fatigue  and fever.   HENT:  Positive for congestion and rhinorrhea. Negative for sore throat.    Respiratory:  Positive for cough. Negative for shortness of breath.    Gastrointestinal:  Negative for anorexia, diarrhea and vomiting.   Musculoskeletal:  Negative for joint pain.   Skin:  Positive for itching and rash.   All other systems reviewed and are negative.        Current Medications       Current Outpatient Medications:     allopurinol (ZYLOPRIM) 100 mg tablet, Take 100 mg by mouth daily, Disp: , Rfl:     atorvastatin (LIPITOR) 80 mg tablet, Take 80 mg by mouth daily, Disp: , Rfl:     carvedilol (COREG) 3.125 mg tablet, Take 3.125 mg by mouth 2 (two) times a day with meals, Disp: , Rfl:     clopidogrel (PLAVIX) 75 mg tablet, Take 75 mg by mouth daily, Disp: , Rfl:     DULoxetine HCl 20 MG CSDR, Take 20 mg by mouth daily, Disp: , Rfl:     escitalopram (LEXAPRO) 10 mg tablet, Take 5 mg by mouth daily, Disp: , Rfl:     Farxiga 10 MG tablet, Take 10 mg by mouth daily, Disp: , Rfl:     fluticasone (FLONASE) 50 mcg/act nasal spray, 2 sprays into each nostril, Disp: , Rfl:     furosemide (LASIX) 20 mg tablet, Take 20 mg by mouth Once daily as needed, Disp: , Rfl:     Insulin Glargine Solostar 100 UNIT/ML SOPN, INJECT 14 UNITS SUBCUTANEOUSLY NIGHTLY, Disp: , Rfl:     magnesium Oxide (MAG-OX) 400 mg TABS, Take 400 mg by mouth 3 (three) times a day, Disp: , Rfl:     Ozempic, 0.25 or 0.5 MG/DOSE, 2 MG/3ML injection pen, INJECT 0.5 MG SUBCUTANEOUSLY EVERY 7 DAYS, Disp: , Rfl:     predniSONE 10 mg tablet, 4 x 3 days, 3 x 1, 2 x 1, 1 x 1, Disp: 18 tablet, Rfl: 0    pregabalin (LYRICA) 100 mg capsule, Take 100 mg by mouth, Disp: , Rfl:     sacubitril-valsartan (Entresto) 24-26 MG TABS, 2 (two) times daily., Disp: , Rfl:     tolterodine (DETROL) 1 mg tablet, Take 1 tablet by mouth 2 (two) times a day, Disp: , Rfl:     cinacalcet (SENSIPAR) 30 mg tablet, , Disp: , Rfl:     midodrine (PROAMATINE) 5 mg tablet, Take 5 mg by mouth 2 (two)  "times a day, Disp: , Rfl:     Current Allergies     Allergies as of 07/06/2024 - Reviewed 07/06/2024   Allergen Reaction Noted    Metformin Other (See Comments) 04/26/2023    Nsaids Other (See Comments) 03/18/2019    Medical tape Rash 05/08/2023            The following portions of the patient's history were reviewed and updated as appropriate: allergies, current medications, past family history, past medical history, past social history, past surgical history and problem list.     Past Medical History:   Diagnosis Date    Anemia     Anxiety     CHF (congestive heart failure) (HCC)     Chronic kidney disease, stage 3 (H[facility])     Depression     Diabetes mellitus (HCC)     Hyperlipemia     Hypertension     Ischemic cardiomyopathy     MI (myocardial infarction) (HCC)     Pacemaker     Peripheral polyneuropathy     Sinus tachycardia        Past Surgical History:   Procedure Laterality Date    CARDIAC PACEMAKER PLACEMENT      CORONARY ANGIOPLASTY WITH STENT PLACEMENT         Family History   Problem Relation Age of Onset    Cancer Mother     Cancer Father          Medications have been verified.        Objective   /80   Pulse (!) 109   Temp (!) 97.4 °F (36.3 °C)   Resp 20   Ht 5' 2\" (1.575 m)   Wt 95 kg (209 lb 6.4 oz)   SpO2 96%   BMI 38.30 kg/m²   No LMP recorded. Patient is postmenopausal.       Physical Exam     Physical Exam  Vitals and nursing note reviewed.   Constitutional:       Appearance: Normal appearance. She is normal weight.   HENT:      Right Ear: Ear canal and external ear normal.      Left Ear: Ear canal and external ear normal.      Nose: Rhinorrhea present. No congestion.      Mouth/Throat:      Mouth: Mucous membranes are moist.      Pharynx: No oropharyngeal exudate or posterior oropharyngeal erythema.   Eyes:      Conjunctiva/sclera: Conjunctivae normal.   Cardiovascular:      Rate and Rhythm: Regular rhythm. Tachycardia present.      Pulses: Normal pulses.      Heart sounds: Normal " heart sounds.   Pulmonary:      Effort: Pulmonary effort is normal.      Breath sounds: Normal breath sounds.   Neurological:      Mental Status: She is alert.   Psychiatric:         Mood and Affect: Mood normal.         Behavior: Behavior normal.         TMs bulging, nasal mucosa boggy.  Erythematous rash legs arms

## 2025-04-08 ENCOUNTER — APPOINTMENT (EMERGENCY)
Dept: CT IMAGING | Facility: HOSPITAL | Age: 70
End: 2025-04-08
Payer: COMMERCIAL

## 2025-04-08 ENCOUNTER — HOSPITAL ENCOUNTER (EMERGENCY)
Facility: HOSPITAL | Age: 70
Discharge: HOME | End: 2025-04-08
Attending: EMERGENCY MEDICINE
Payer: COMMERCIAL

## 2025-04-08 VITALS
TEMPERATURE: 97 F | SYSTOLIC BLOOD PRESSURE: 132 MMHG | OXYGEN SATURATION: 97 % | RESPIRATION RATE: 18 BRPM | BODY MASS INDEX: 37.24 KG/M2 | HEIGHT: 62 IN | HEART RATE: 100 BPM | DIASTOLIC BLOOD PRESSURE: 74 MMHG | WEIGHT: 202.38 LBS

## 2025-04-08 DIAGNOSIS — J32.9 SINUSITIS: Primary | ICD-10-CM

## 2025-04-08 DIAGNOSIS — J40 COMPLICATED BRONCHITIS: ICD-10-CM

## 2025-04-08 LAB
ALBUMIN SERPL BCG-MCNC: 3.8 G/DL (ref 3.5–5)
ALP SERPL-CCNC: 79 U/L (ref 34–104)
ALT SERPL W P-5'-P-CCNC: 10 U/L (ref 7–52)
ANION GAP SERPL CALCULATED.3IONS-SCNC: 11 MMOL/L (ref 4–13)
AST SERPL W P-5'-P-CCNC: 10 U/L (ref 13–39)
ATRIAL RATE: 89 BPM
BASE EX.OXY STD BLDV CALC-SCNC: 92.8 % (ref 60–80)
BASE EXCESS BLDV CALC-SCNC: 1.9 MMOL/L
BASOPHILS # BLD AUTO: 0.01 THOUSANDS/ÂΜL (ref 0–0.1)
BASOPHILS NFR BLD AUTO: 0 % (ref 0–1)
BILIRUB SERPL-MCNC: 0.5 MG/DL (ref 0.2–1)
BNP SERPL-MCNC: 316 PG/ML (ref 0–100)
BUN SERPL-MCNC: 49 MG/DL (ref 5–25)
CALCIUM SERPL-MCNC: 8.3 MG/DL (ref 8.4–10.2)
CARDIAC TROPONIN I PNL SERPL HS: 11 NG/L (ref ?–50)
CHLORIDE SERPL-SCNC: 99 MMOL/L (ref 96–108)
CO2 SERPL-SCNC: 28 MMOL/L (ref 21–32)
CREAT SERPL-MCNC: 1.34 MG/DL (ref 0.6–1.3)
EOSINOPHIL # BLD AUTO: 0.52 THOUSAND/ÂΜL (ref 0–0.61)
EOSINOPHIL NFR BLD AUTO: 4 % (ref 0–6)
ERYTHROCYTE [DISTWIDTH] IN BLOOD BY AUTOMATED COUNT: 15 % (ref 11.6–15.1)
GFR SERPL CREATININE-BSD FRML MDRD: 40 ML/MIN/1.73SQ M
GLUCOSE SERPL-MCNC: 202 MG/DL (ref 65–140)
HCO3 BLDV-SCNC: 25.2 MMOL/L (ref 24–30)
HCT VFR BLD AUTO: 44.4 % (ref 34.8–46.1)
HGB BLD-MCNC: 14.3 G/DL (ref 11.5–15.4)
IMM GRANULOCYTES # BLD AUTO: 0.09 THOUSAND/UL (ref 0–0.2)
IMM GRANULOCYTES NFR BLD AUTO: 1 % (ref 0–2)
LYMPHOCYTES # BLD AUTO: 1.48 THOUSANDS/ÂΜL (ref 0.6–4.47)
LYMPHOCYTES NFR BLD AUTO: 12 % (ref 14–44)
MAGNESIUM SERPL-MCNC: 1.9 MG/DL (ref 1.9–2.7)
MCH RBC QN AUTO: 27.6 PG (ref 26.8–34.3)
MCHC RBC AUTO-ENTMCNC: 32.2 G/DL (ref 31.4–37.4)
MCV RBC AUTO: 86 FL (ref 82–98)
MONOCYTES # BLD AUTO: 0.69 THOUSAND/ÂΜL (ref 0.17–1.22)
MONOCYTES NFR BLD AUTO: 5 % (ref 4–12)
NEUTROPHILS # BLD AUTO: 9.97 THOUSANDS/ÂΜL (ref 1.85–7.62)
NEUTS SEG NFR BLD AUTO: 78 % (ref 43–75)
NRBC BLD AUTO-RTO: 0 /100 WBCS
O2 CT BLDV-SCNC: 19.8 ML/DL
P AXIS: 43 DEGREES
PCO2 BLDV: 35.8 MM HG (ref 42–50)
PH BLDV: 7.47 [PH] (ref 7.3–7.4)
PLATELET # BLD AUTO: 341 THOUSANDS/UL (ref 149–390)
PMV BLD AUTO: 10.7 FL (ref 8.9–12.7)
PO2 BLDV: 74.8 MM HG (ref 35–45)
POTASSIUM SERPL-SCNC: 4.2 MMOL/L (ref 3.5–5.3)
PR INTERVAL: 146 MS
PROT SERPL-MCNC: 6.7 G/DL (ref 6.4–8.4)
QRS AXIS: -24 DEGREES
QRSD INTERVAL: 102 MS
QT INTERVAL: 410 MS
QTC INTERVAL: 498 MS
RBC # BLD AUTO: 5.18 MILLION/UL (ref 3.81–5.12)
SODIUM SERPL-SCNC: 138 MMOL/L (ref 135–147)
T WAVE AXIS: 74 DEGREES
VENTRICULAR RATE: 89 BPM
WBC # BLD AUTO: 12.76 THOUSAND/UL (ref 4.31–10.16)

## 2025-04-08 PROCEDURE — 93005 ELECTROCARDIOGRAM TRACING: CPT

## 2025-04-08 PROCEDURE — 96360 HYDRATION IV INFUSION INIT: CPT

## 2025-04-08 PROCEDURE — 84484 ASSAY OF TROPONIN QUANT: CPT | Performed by: PHYSICIAN ASSISTANT

## 2025-04-08 PROCEDURE — 71250 CT THORAX DX C-: CPT

## 2025-04-08 PROCEDURE — 80053 COMPREHEN METABOLIC PANEL: CPT | Performed by: PHYSICIAN ASSISTANT

## 2025-04-08 PROCEDURE — 99284 EMERGENCY DEPT VISIT MOD MDM: CPT

## 2025-04-08 PROCEDURE — 94640 AIRWAY INHALATION TREATMENT: CPT

## 2025-04-08 PROCEDURE — 93010 ELECTROCARDIOGRAM REPORT: CPT | Performed by: INTERNAL MEDICINE

## 2025-04-08 PROCEDURE — 83880 ASSAY OF NATRIURETIC PEPTIDE: CPT | Performed by: PHYSICIAN ASSISTANT

## 2025-04-08 PROCEDURE — 83735 ASSAY OF MAGNESIUM: CPT | Performed by: PHYSICIAN ASSISTANT

## 2025-04-08 PROCEDURE — 36415 COLL VENOUS BLD VENIPUNCTURE: CPT | Performed by: PHYSICIAN ASSISTANT

## 2025-04-08 PROCEDURE — 82805 BLOOD GASES W/O2 SATURATION: CPT | Performed by: PHYSICIAN ASSISTANT

## 2025-04-08 PROCEDURE — 85025 COMPLETE CBC W/AUTO DIFF WBC: CPT | Performed by: PHYSICIAN ASSISTANT

## 2025-04-08 RX ORDER — PREDNISONE 20 MG/1
40 TABLET ORAL DAILY
Qty: 6 TABLET | Refills: 0 | Status: SHIPPED | OUTPATIENT
Start: 2025-04-08 | End: 2025-04-11

## 2025-04-08 RX ORDER — DOXYCYCLINE 100 MG/1
100 CAPSULE ORAL 2 TIMES DAILY
Qty: 14 CAPSULE | Refills: 0 | Status: SHIPPED | OUTPATIENT
Start: 2025-04-08 | End: 2025-04-15

## 2025-04-08 RX ORDER — IPRATROPIUM BROMIDE AND ALBUTEROL SULFATE 2.5; .5 MG/3ML; MG/3ML
3 SOLUTION RESPIRATORY (INHALATION) ONCE
Status: COMPLETED | OUTPATIENT
Start: 2025-04-08 | End: 2025-04-08

## 2025-04-08 RX ORDER — IPRATROPIUM BROMIDE AND ALBUTEROL SULFATE 2.5; .5 MG/3ML; MG/3ML
3 SOLUTION RESPIRATORY (INHALATION) 4 TIMES DAILY
Qty: 360 ML | Refills: 0 | Status: SHIPPED | OUTPATIENT
Start: 2025-04-08 | End: 2025-05-08

## 2025-04-08 RX ADMIN — IPRATROPIUM BROMIDE AND ALBUTEROL SULFATE 3 ML: .5; 3 SOLUTION RESPIRATORY (INHALATION) at 13:43

## 2025-04-08 RX ADMIN — SODIUM CHLORIDE 500 ML: 0.9 INJECTION, SOLUTION INTRAVENOUS at 12:47

## 2025-04-08 NOTE — Clinical Note
Rita Meadows was seen and treated in our emergency department on 4/8/2025.                Diagnosis:     Rita  is off the rest of the shift today, may return to work on return date.    She may return on this date: 04/17/2025         If you have any questions or concerns, please don't hesitate to call.      Bhanu Saldana PA-C    ______________________________           _______________          _______________  Hospital Representative                              Date                                Time

## 2025-04-08 NOTE — ED PROVIDER NOTES
Time reflects when diagnosis was documented in both MDM as applicable and the Disposition within this note       Time User Action Codes Description Comment    4/8/2025  2:23 PM Bhanu Saldana [J32.9] Sinusitis     4/8/2025  2:23 PM Bhanu Saldana [J40] Complicated bronchitis           ED Disposition       ED Disposition   Discharge    Condition   Stable    Date/Time   Tue Apr 8, 2025  2:23 PM    Comment   Rita [facility]ows discharge to home/self care.                   Assessment & Plan       Medical Decision Making  The patient is a 69-year-old female with a past medical history of hypertension, heart failure, diabetes who presents with a chief complaint of ongoing cough worsening shortness of breath.  Patient states that she has been sick for 2 and half weeks with cough cold nasal congestion.  She states that on 31 March she went to her primary care physician who gave her steroids and Z-Bj with inhaler.  She states she finished this therapy 5 days ago and has not had any relief.  She still having a persistent cough.  Having nasal congestion and frontal headaches.  States that she is not having any sputum production with cough but feels tight in her chest and having shortness of breath.  Denies any fevers chills nausea vomiting abdominal pain or back pain.        Patient did have improved breath sounds in symptomatic improvement with nebulizer.  Patient did not happen to have pneumonia but symptoms were consistent with sinusitis.  Will do a 3-day course of prednisone with nebulizers and doxycycline.  Patient in agreement to plan    Differential diagnosis included but was not limited to :Pneumonia, Sinusitis, URI, ACS, GERD,      Amount and/or Complexity of Data Reviewed  Labs: ordered. Decision-making details documented in ED Course.  Radiology: ordered and independent interpretation performed. Decision-making details documented in ED Course.  ECG/medicine tests: ordered.    Risk  Prescription drug  management.        ED Course as of 04/08/25 1624   Tue Apr 08, 2025   1301 WBC(!): 12.76   1316 hs TnI 0hr: 11   1316 BNP(!): 316       Medications   sodium chloride 0.9 % bolus 500 mL (0 mL Intravenous Stopped 4/8/25 1348)   ipratropium-albuterol (DUO-NEB) 0.5-2.5 mg/3 mL inhalation solution 3 mL (3 mL Nebulization Given 4/8/25 1343)       ED Risk Strat Scores   HEART Risk Score      Flowsheet Row Most Recent Value   Heart Score Risk Calculator    History 0 Filed at: 04/08/2025 1624   ECG 0 Filed at: 04/08/2025 1624   Age 2 Filed at: 04/08/2025 1624   Risk Factors 2 Filed at: 04/08/2025 1624   Troponin 0 Filed at: 04/08/2025 1624   HEART Score 4 Filed at: 04/08/2025 1624          HEART Risk Score      Flowsheet Row Most Recent Value   Heart Score Risk Calculator    History 0 Filed at: 04/08/2025 1624   ECG 0 Filed at: 04/08/2025 1624   Age 2 Filed at: 04/08/2025 1624   Risk Factors 2 Filed at: 04/08/2025 1624   Troponin 0 Filed at: 04/08/2025 1624   HEART Score 4 Filed at: 04/08/2025 1624                      No data recorded        SBIRT 20yo+      Flowsheet Row Most Recent Value   Initial Alcohol Screen: US AUDIT-C     1. How often do you have a drink containing alcohol? 0 Filed at: 04/08/2025 1232   2. How many drinks containing alcohol do you have on a typical day you are drinking?  0 Filed at: 04/08/2025 1232   3b. FEMALE Any Age, or MALE 65+: How often do you have 4 or more drinks on one occassion? 0 Filed at: 04/08/2025 1232   Audit-C Score 0 Filed at: 04/08/2025 1232   JAKE: How many times in the past year have you...    Used an illegal drug or used a prescription medication for non-medical reasons? Never Filed at: 04/08/2025 1232                            History of Present Illness       Chief Complaint   Patient presents with    Cough     Patient has had cough for a few weeks. Seen by PCP last week and prescribed abx, inhaler & prednisone. States cough is not better        Past Medical History:    Diagnosis Date    Anemia     Anxiety     CHF (congestive heart failure) (HCC)     Chronic kidney disease, stage 3 (HCC)     Depression     Diabetes mellitus (HCC)     Hyperlipemia     Hypertension     Ischemic cardiomyopathy     MI (myocardial infarction) (HCC)     Pacemaker     Peripheral polyneuropathy     Sinus tachycardia       Past Surgical History:   Procedure Laterality Date    CARDIAC PACEMAKER PLACEMENT      CORONARY ANGIOPLASTY WITH STENT PLACEMENT        Family History   Problem Relation Age of Onset    Cancer Mother     Cancer Father       Social History     Tobacco Use    Smoking status: Never     Passive exposure: Never    Smokeless tobacco: Never   Vaping Use    Vaping status: Never Used   Substance Use Topics    Alcohol use: Not Currently    Drug use: Never      E-Cigarette/Vaping    E-Cigarette Use Never User       E-Cigarette/Vaping Substances      I have reviewed and agree with the history as documented.     The patient is a 69-year-old female with a past medical history of hypertension, heart failure, diabetes who presents with a chief complaint of ongoing cough worsening shortness of breath.  Patient states that she has been sick for 2 and half weeks with cough cold nasal congestion.  She states that on 31 March she went to her primary care physician who gave her steroids and Z-Bj with inhaler.  She states she finished this therapy 5 days ago and has not had any relief.  She still having a persistent cough.  Having nasal congestion and frontal headaches.  States that she is not having any sputum production with cough but feels tight in her chest and having shortness of breath.  Denies any fevers chills nausea vomiting abdominal pain or back pain.          Review of Systems   All other systems reviewed and are negative.          Objective       ED Triage Vitals [04/08/25 1232]   Temperature Pulse Blood Pressure Respirations SpO2 Patient Position - Orthostatic VS   (!) 97 °F (36.1 °C) 100  132/74 18 97 % Lying      Temp Source Heart Rate Source BP Location FiO2 (%) Pain Score    Temporal Monitor Right arm -- --      Vitals      Date and Time Temp Pulse SpO2 Resp BP Pain Score FACES Pain Rating User   04/08/25 1232 97 °F (36.1 °C) 100 97 % 18 132/74 -- -- SL            Physical Exam  Vitals and nursing note reviewed.   Constitutional:       General: She is not in acute distress.     Appearance: She is well-developed.   HENT:      Head: Normocephalic and atraumatic.      Right Ear: External ear normal.      Left Ear: External ear normal.      Mouth/Throat:      Mouth: Mucous membranes are moist.   Eyes:      Extraocular Movements: Extraocular movements intact.      Pupils: Pupils are equal, round, and reactive to light.   Cardiovascular:      Rate and Rhythm: Regular rhythm. Tachycardia present.      Heart sounds: No murmur heard.  Pulmonary:      Effort: Pulmonary effort is normal. No respiratory distress.      Breath sounds: Wheezing and rhonchi present.   Abdominal:      General: Bowel sounds are normal.      Palpations: Abdomen is soft. There is no mass.      Tenderness: There is no abdominal tenderness. There is no rebound.      Hernia: No hernia is present.   Musculoskeletal:      Cervical back: Normal range of motion and neck supple.      Right lower leg: No edema.      Left lower leg: No edema.   Skin:     General: Skin is warm and dry.      Capillary Refill: Capillary refill takes less than 2 seconds.   Neurological:      General: No focal deficit present.      Mental Status: She is alert and oriented to person, place, and time.      Coordination: Coordination normal.   Psychiatric:         Behavior: Behavior normal.         Results Reviewed       Procedure Component Value Units Date/Time    HS Troponin 0hr (reflex protocol) [[id number]]  (Normal) Collected: 04/08/25 1247    Lab Status: Final result Specimen: Blood from Arm, Right Updated: 04/08/25 1315     hs TnI 0hr 11 ng/L     B-Type  Natriuretic Peptide(BNP) [[id number]]  (Abnormal) Collected: 04/08/25 1247    Lab Status: Final result Specimen: Blood from Arm, Right Updated: 04/08/25 1314      pg/mL     Comprehensive metabolic panel [[id number]]  (Abnormal) Collected: 04/08/25 1247    Lab Status: Final result Specimen: Blood from Arm, Right Updated: 04/08/25 1309     Sodium 138 mmol/L      Potassium 4.2 mmol/L      Chloride 99 mmol/L      CO2 28 mmol/L      ANION GAP 11 mmol/L      BUN 49 mg/dL      Creatinine 1.34 mg/dL      Glucose 202 mg/dL      Calcium 8.3 mg/dL      AST 10 U/L      ALT 10 U/L      Alkaline Phosphatase 79 U/L      Total Protein 6.7 g/dL      Albumin 3.8 g/dL      Total Bilirubin 0.50 mg/dL      eGFR 40 ml/min/1.73sq m     Narrative:      [employer] guidelines for Chronic Kidney Disease (CKD):     Stage 1 with normal or high GFR (GFR > 90 mL/min/1.73 square meters)    Stage 2 Mild CKD (GFR = 60-89 mL/min/1.73 square meters)    Stage 3A Moderate CKD (GFR = 45-59 mL/min/1.73 square meters)    Stage 3B Moderate CKD (GFR = 30-44 mL/min/1.73 square meters)    Stage 4 Severe CKD (GFR = 15-29 mL/min/1.73 square meters)    Stage 5 End Stage CKD (GFR <15 mL/min/1.73 square meters)  Note: GFR calculation is accurate only with a steady state creatinine    Magnesium [[id number]]  (Normal) Collected: 04/08/25 1247    Lab Status: Final result Specimen: Blood from Arm, Right Updated: 04/08/25 1309     Magnesium 1.9 mg/dL     Blood gas, venous [[id number]]  (Abnormal) Collected: 04/08/25 1247    Lab Status: Final result Specimen: Blood from Arm, Right Updated: 04/08/25 1257     pH, Yared 7.466     pCO2, Yared 35.8 mm Hg      pO2, Yared 74.8 mm Hg      HCO3, Yared 25.2 mmol/L      Base Excess, Yared 1.9 mmol/L      O2 Content, Yared 19.8 ml/dL      O2 HGB, VENOUS 92.8 %     CBC and differential [[id number]]  (Abnormal) Collected: 04/08/25 1247    Lab Status: Final result Specimen: Blood from Arm, Right Updated: 04/08/25 1259      WBC 12.76 Thousand/uL      RBC 5.18 Million/uL      Hemoglobin 14.3 g/dL      Hematocrit 44.4 %      MCV 86 fL      MCH 27.6 pg      MCHC 32.2 g/dL      RDW 15.0 %      MPV 10.7 fL      Platelets 341 Thousands/uL      nRBC 0 /100 WBCs      Segmented % 78 %      Immature Grans % 1 %      Lymphocytes % 12 %      Monocytes % 5 %      Eosinophils Relative 4 %      Basophils Relative 0 %      Absolute Neutrophils 9.97 Thousands/µL      Absolute Immature Grans 0.09 Thousand/uL      Absolute Lymphocytes 1.48 Thousands/µL      Absolute Monocytes 0.69 Thousand/µL      Eosinophils Absolute 0.52 Thousand/µL      Basophils Absolute 0.01 Thousands/µL             CT chest without contrast   Final Interpretation by David Jones DO (04/08 1416)      No definite acute findings in the chest.      Minimal left lower lobe groundglass opacity with a slightly linear configuration on coronal imaging possibly atelectasis/scar as opposed to infectious/inflammatory.      Pulmonary parenchymal findings most suggestive of mild basilar fibrosis.      If relevant, outpatient pulmonary consultation and/or follow-up high-resolution CT chest in 6 months may be helpful.                  Resident: Toney Pompa I, the attending radiologist, have reviewed the images and agree with the final report above.      Workstation performed: [id number]             Procedures    ED Medication and Procedure Management   Prior to Admission Medications   Prescriptions Last Dose Informant Patient Reported? Taking?   DULoxetine HCl 20 MG CSDR   Yes No   Sig: Take 20 mg by mouth daily   Farxiga 10 MG tablet   Yes No   Sig: Take 10 mg by mouth daily   Insulin Glargine Solostar 100 UNIT/ML SOPN   Yes No   Sig: INJECT 14 UNITS SUBCUTANEOUSLY NIGHTLY   Ozempic, 0.25 or 0.5 MG/DOSE, 2 MG/3ML injection pen   Yes No   Sig: INJECT 0.5 MG SUBCUTANEOUSLY EVERY 7 DAYS   allopurinol (ZYLOPRIM) 100 mg tablet   Yes No   Sig: Take 100 mg by mouth daily    atorvastatin (LIPITOR) 80 mg tablet   Yes No   Sig: Take 80 mg by mouth daily   carvedilol (COREG) 3.125 mg tablet   Yes No   Sig: Take 3.125 mg by mouth 2 (two) times a day with meals   cinacalcet (SENSIPAR) 30 mg tablet   Yes No   clopidogrel (PLAVIX) 75 mg tablet   Yes No   Sig: Take 75 mg by mouth daily   escitalopram (LEXAPRO) 10 mg tablet   Yes No   Sig: Take 5 mg by mouth daily   fluticasone (FLONASE) 50 mcg/act nasal spray   Yes No   Si sprays into each nostril   magnesium Oxide (MAG-OX) 400 mg TABS   Yes No   Sig: Take 400 mg by mouth 3 (three) times a day   midodrine (PROAMATINE) 5 mg tablet   Yes No   Sig: Take 5 mg by mouth 2 (two) times a day   predniSONE 10 mg tablet   No No   Sig: 4 x 3 days, 3 x 1, 2 x 1, 1 x 1   pregabalin (LYRICA) 100 mg capsule   Yes No   Sig: Take 100 mg by mouth   sacubitril-valsartan (Entresto) 24-26 MG TABS   Yes No   Si (two) times daily.   tolterodine (DETROL) 1 mg tablet   Yes No   Sig: Take 1 tablet by mouth 2 (two) times a day      Facility-Administered Medications: None     Discharge Medication List as of 2025  2:25 PM        START taking these medications    Details   doxycycline hyclate (VIBRAMYCIN) 100 mg capsule Take 1 capsule (100 mg total) by mouth 2 (two) times a day for 7 days, Starting 2025, Until Tue 4/15/2025, Normal      ipratropium-albuterol (DUO-NEB) 0.5-2.5 mg/3 mL nebulizer solution Take 3 mL by nebulization 4 (four) times a day, Starting 2025, Until 2025, Normal      !! predniSONE 20 mg tablet Take 2 tablets (40 mg total) by mouth daily for 3 days, Starting 2025, Until 2025, Normal       !! - Potential duplicate medications found. Please discuss with provider.        CONTINUE these medications which have NOT CHANGED    Details   allopurinol (ZYLOPRIM) 100 mg tablet Take 100 mg by mouth daily, Historical Med      atorvastatin (LIPITOR) 80 mg tablet Take 80 mg by mouth daily, Historical Med       carvedilol (COREG) 3.125 mg tablet Take 3.125 mg by mouth 2 (two) times a day with meals, Historical Med      cinacalcet (SENSIPAR) 30 mg tablet Historical Med      clopidogrel (PLAVIX) 75 mg tablet Take 75 mg by mouth daily, Historical Med      DULoxetine HCl 20 MG CSDR Take 20 mg by mouth daily, Historical Med      escitalopram (LEXAPRO) 10 mg tablet Take 5 mg by mouth daily, Historical Med      Farxiga 10 MG tablet Take 10 mg by mouth daily, Starting Mon 4/29/2024, Historical Med      fluticasone (FLONASE) 50 mcg/act nasal spray 2 sprays into each nostril, Starting Wed 9/9/2020, Until Mon 3/29/2027 at 2359, Historical Med      Insulin Glargine Solostar 100 UNIT/ML SOPN INJECT 14 UNITS SUBCUTANEOUSLY NIGHTLY, Historical Med      magnesium Oxide (MAG-OX) 400 mg TABS Take 400 mg by mouth 3 (three) times a day, Starting Thu 5/2/2024, Historical Med      midodrine (PROAMATINE) 5 mg tablet Take 5 mg by mouth 2 (two) times a day, Historical Med      Ozempic, 0.25 or 0.5 MG/DOSE, 2 MG/3ML injection pen INJECT 0.5 MG SUBCUTANEOUSLY EVERY 7 DAYS, Historical Med      !! predniSONE 10 mg tablet 4 x 3 days, 3 x 1, 2 x 1, 1 x 1, Normal      pregabalin (LYRICA) 100 mg capsule Take 100 mg by mouth, Starting Mon 4/3/2023, Until Sat 7/6/2024 at 2359, Historical Med      sacubitril-valsartan (Entresto) 24-26 MG TABS 2 (two) times daily., Historical Med      tolterodine (DETROL) 1 mg tablet Take 1 tablet by mouth 2 (two) times a day, Starting Thu 5/16/2024, Historical Med       !! - Potential duplicate medications found. Please discuss with provider.        Outpatient Discharge Orders   Nebulizer     ED SEPSIS DOCUMENTATION   Time reflects when diagnosis was documented in both MDM as applicable and the Disposition within this note       Time User Action Codes Description Comment    4/8/2025  2:23 PM Bhanu Saldana [J32.9] Sinusitis     4/8/2025  2:23 PM Bhanu Saldana [J40] Complicated bronchitis                  Bhanu  Rachel Saldana PA-C  04/08/25 5910